# Patient Record
Sex: FEMALE | Race: WHITE | Employment: UNEMPLOYED | URBAN - METROPOLITAN AREA
[De-identification: names, ages, dates, MRNs, and addresses within clinical notes are randomized per-mention and may not be internally consistent; named-entity substitution may affect disease eponyms.]

---

## 2020-08-07 ENCOUNTER — ANESTHESIA EVENT (OUTPATIENT)
Dept: OPERATING ROOM | Age: 40
End: 2020-08-07

## 2020-08-07 ENCOUNTER — APPOINTMENT (OUTPATIENT)
Dept: ULTRASOUND IMAGING | Age: 40
End: 2020-08-07

## 2020-08-07 ENCOUNTER — APPOINTMENT (OUTPATIENT)
Dept: GENERAL RADIOLOGY | Age: 40
End: 2020-08-07

## 2020-08-07 ENCOUNTER — HOSPITAL ENCOUNTER (OUTPATIENT)
Age: 40
Discharge: OTHER INSTITUTION | End: 2020-08-07
Attending: EMERGENCY MEDICINE | Admitting: OBSTETRICS & GYNECOLOGY

## 2020-08-07 ENCOUNTER — ANESTHESIA (OUTPATIENT)
Dept: OPERATING ROOM | Age: 40
End: 2020-08-07

## 2020-08-07 VITALS — OXYGEN SATURATION: 100 % | TEMPERATURE: 98 F | SYSTOLIC BLOOD PRESSURE: 172 MMHG | DIASTOLIC BLOOD PRESSURE: 151 MMHG

## 2020-08-07 VITALS
TEMPERATURE: 97 F | HEIGHT: 67 IN | HEART RATE: 105 BPM | BODY MASS INDEX: 36.1 KG/M2 | RESPIRATION RATE: 32 BRPM | SYSTOLIC BLOOD PRESSURE: 122 MMHG | DIASTOLIC BLOOD PRESSURE: 65 MMHG | WEIGHT: 230 LBS | OXYGEN SATURATION: 94 %

## 2020-08-07 PROBLEM — O00.109 TUBAL PREGNANCY WITHOUT INTRAUTERINE PREGNANCY: Status: ACTIVE | Noted: 2020-08-07

## 2020-08-07 PROBLEM — O00.90 RUPTURED ECTOPIC PREGNANCY: Status: ACTIVE | Noted: 2020-08-07

## 2020-08-07 PROBLEM — K66.1 RUPTURED RIGHT TUBAL ECTOPIC PREGNANCY CAUSING HEMOPERITONEUM: Status: ACTIVE | Noted: 2020-08-07

## 2020-08-07 PROBLEM — O00.101 RUPTURED RIGHT TUBAL ECTOPIC PREGNANCY CAUSING HEMOPERITONEUM: Status: ACTIVE | Noted: 2020-08-07

## 2020-08-07 LAB
A/G RATIO: 1.4 (ref 1.1–2.2)
ABO/RH: NORMAL
ALBUMIN SERPL-MCNC: 4.3 G/DL (ref 3.4–5)
ALP BLD-CCNC: 48 U/L (ref 40–129)
ALT SERPL-CCNC: 10 U/L (ref 10–40)
ANION GAP SERPL CALCULATED.3IONS-SCNC: 13 MMOL/L (ref 3–16)
ANTIBODY SCREEN: NORMAL
AST SERPL-CCNC: 11 U/L (ref 15–37)
BASE EXCESS VENOUS: -6 MMOL/L (ref -3–3)
BASE EXCESS VENOUS: -6.6 MMOL/L (ref -3–3)
BASOPHILS ABSOLUTE: 0.1 K/UL (ref 0–0.2)
BASOPHILS RELATIVE PERCENT: 0.6 %
BILIRUB SERPL-MCNC: 0.6 MG/DL (ref 0–1)
BLOOD BANK DISPENSE STATUS: NORMAL
BLOOD BANK DISPENSE STATUS: NORMAL
BLOOD BANK PRODUCT CODE: NORMAL
BLOOD BANK PRODUCT CODE: NORMAL
BPU ID: NORMAL
BPU ID: NORMAL
BUN BLDV-MCNC: 13 MG/DL (ref 7–20)
CALCIUM IONIZED: 1.01 MMOL/L (ref 1.12–1.32)
CALCIUM SERPL-MCNC: 9.4 MG/DL (ref 8.3–10.6)
CARBOXYHEMOGLOBIN: 3.3 % (ref 0–1.5)
CARBOXYHEMOGLOBIN: 3.8 % (ref 0–1.5)
CHLORIDE BLD-SCNC: 102 MMOL/L (ref 99–110)
CO2: 22 MMOL/L (ref 21–32)
CREAT SERPL-MCNC: 1.1 MG/DL (ref 0.6–1.1)
D DIMER: <200 NG/ML DDU (ref 0–229)
DESCRIPTION BLOOD BANK: NORMAL
DESCRIPTION BLOOD BANK: NORMAL
EKG ATRIAL RATE: 95 BPM
EKG DIAGNOSIS: NORMAL
EKG P AXIS: 6 DEGREES
EKG P-R INTERVAL: 134 MS
EKG Q-T INTERVAL: 382 MS
EKG QRS DURATION: 78 MS
EKG QTC CALCULATION (BAZETT): 480 MS
EKG R AXIS: -14 DEGREES
EKG T AXIS: 1 DEGREES
EKG VENTRICULAR RATE: 95 BPM
EOSINOPHILS ABSOLUTE: 0.2 K/UL (ref 0–0.6)
EOSINOPHILS RELATIVE PERCENT: 1.4 %
ETHANOL: NORMAL MG/DL (ref 0–0.08)
GFR AFRICAN AMERICAN: >60
GFR NON-AFRICAN AMERICAN: 56
GLOBULIN: 3 G/DL
GLUCOSE BLD-MCNC: 226 MG/DL (ref 70–99)
GONADOTROPIN, CHORIONIC (HCG) QUANT: 7821 MIU/ML
HCG QUALITATIVE: POSITIVE
HCO3 VENOUS: 20.4 MMOL/L (ref 23–29)
HCO3 VENOUS: 21.4 MMOL/L (ref 23–29)
HCT VFR BLD CALC: 33.8 % (ref 36–48)
HCT VFR BLD CALC: 39 % (ref 36–48)
HEMOGLOBIN: 11.3 G/DL (ref 12–16)
HEMOGLOBIN: 12.9 G/DL (ref 12–16)
INR BLD: 1.3 (ref 0.86–1.14)
LACTIC ACID: 2.4 MMOL/L (ref 0.4–2)
LACTIC ACID: 3 MMOL/L (ref 0.4–2)
LIPASE: 78 U/L (ref 13–60)
LYMPHOCYTES ABSOLUTE: 3.5 K/UL (ref 1–5.1)
LYMPHOCYTES RELATIVE PERCENT: 22.4 %
MAGNESIUM: 2 MG/DL (ref 1.8–2.4)
MCH RBC QN AUTO: 30.5 PG (ref 26–34)
MCHC RBC AUTO-ENTMCNC: 33.2 G/DL (ref 31–36)
MCV RBC AUTO: 91.8 FL (ref 80–100)
METHEMOGLOBIN VENOUS: 0.3 %
METHEMOGLOBIN VENOUS: 0.5 %
MONOCYTES ABSOLUTE: 0.7 K/UL (ref 0–1.3)
MONOCYTES RELATIVE PERCENT: 4.3 %
NEUTROPHILS ABSOLUTE: 11 K/UL (ref 1.7–7.7)
NEUTROPHILS RELATIVE PERCENT: 71.3 %
O2 CONTENT, VEN: 12 VOL %
O2 CONTENT, VEN: 12 VOL %
O2 SAT, VEN: 67 %
O2 SAT, VEN: 80 %
O2 THERAPY: ABNORMAL
O2 THERAPY: ABNORMAL
PCO2, VEN: 46.5 MMHG (ref 40–50)
PCO2, VEN: 49.7 MMHG (ref 40–50)
PDW BLD-RTO: 12.6 % (ref 12.4–15.4)
PH VENOUS: 7.25 (ref 7.35–7.45)
PH VENOUS: 7.26 (ref 7.35–7.45)
PH VENOUS: 7.28 (ref 7.35–7.45)
PLATELET # BLD: 345 K/UL (ref 135–450)
PMV BLD AUTO: 9.5 FL (ref 5–10.5)
PO2, VEN: 38.2 MMHG (ref 25–40)
PO2, VEN: 47.3 MMHG (ref 25–40)
POTASSIUM SERPL-SCNC: 4 MMOL/L (ref 3.5–5.1)
PROTHROMBIN TIME: 15.1 SEC (ref 10–13.2)
RBC # BLD: 4.25 M/UL (ref 4–5.2)
SARS-COV-2, NAAT: NOT DETECTED
SODIUM BLD-SCNC: 137 MMOL/L (ref 136–145)
TCO2 CALC VENOUS: 22 MMOL/L
TCO2 CALC VENOUS: 23 MMOL/L
TOTAL PROTEIN: 7.3 G/DL (ref 6.4–8.2)
TROPONIN: <0.01 NG/ML
WBC # BLD: 15.4 K/UL (ref 4–11)

## 2020-08-07 PROCEDURE — 82803 BLOOD GASES ANY COMBINATION: CPT

## 2020-08-07 PROCEDURE — 36430 TRANSFUSION BLD/BLD COMPNT: CPT

## 2020-08-07 PROCEDURE — 94761 N-INVAS EAR/PLS OXIMETRY MLT: CPT

## 2020-08-07 PROCEDURE — 82947 ASSAY GLUCOSE BLOOD QUANT: CPT

## 2020-08-07 PROCEDURE — 3600000014 HC SURGERY LEVEL 4 ADDTL 15MIN: Performed by: OBSTETRICS & GYNECOLOGY

## 2020-08-07 PROCEDURE — 2500000003 HC RX 250 WO HCPCS: Performed by: NURSE ANESTHETIST, CERTIFIED REGISTERED

## 2020-08-07 PROCEDURE — 80053 COMPREHEN METABOLIC PANEL: CPT

## 2020-08-07 PROCEDURE — 2720000010 HC SURG SUPPLY STERILE: Performed by: OBSTETRICS & GYNECOLOGY

## 2020-08-07 PROCEDURE — 2580000003 HC RX 258: Performed by: OBSTETRICS & GYNECOLOGY

## 2020-08-07 PROCEDURE — 84702 CHORIONIC GONADOTROPIN TEST: CPT

## 2020-08-07 PROCEDURE — 86923 COMPATIBILITY TEST ELECTRIC: CPT

## 2020-08-07 PROCEDURE — 3600000004 HC SURGERY LEVEL 4 BASE: Performed by: OBSTETRICS & GYNECOLOGY

## 2020-08-07 PROCEDURE — 3700000001 HC ADD 15 MINUTES (ANESTHESIA): Performed by: OBSTETRICS & GYNECOLOGY

## 2020-08-07 PROCEDURE — 85025 COMPLETE CBC W/AUTO DIFF WBC: CPT

## 2020-08-07 PROCEDURE — 86850 RBC ANTIBODY SCREEN: CPT

## 2020-08-07 PROCEDURE — 83690 ASSAY OF LIPASE: CPT

## 2020-08-07 PROCEDURE — U0002 COVID-19 LAB TEST NON-CDC: HCPCS

## 2020-08-07 PROCEDURE — 85379 FIBRIN DEGRADATION QUANT: CPT

## 2020-08-07 PROCEDURE — 6360000002 HC RX W HCPCS: Performed by: EMERGENCY MEDICINE

## 2020-08-07 PROCEDURE — 88305 TISSUE EXAM BY PATHOLOGIST: CPT

## 2020-08-07 PROCEDURE — 82330 ASSAY OF CALCIUM: CPT

## 2020-08-07 PROCEDURE — 3700000000 HC ANESTHESIA ATTENDED CARE: Performed by: OBSTETRICS & GYNECOLOGY

## 2020-08-07 PROCEDURE — 7100000001 HC PACU RECOVERY - ADDTL 15 MIN: Performed by: OBSTETRICS & GYNECOLOGY

## 2020-08-07 PROCEDURE — 84132 ASSAY OF SERUM POTASSIUM: CPT

## 2020-08-07 PROCEDURE — 85610 PROTHROMBIN TIME: CPT

## 2020-08-07 PROCEDURE — 85018 HEMOGLOBIN: CPT

## 2020-08-07 PROCEDURE — 6360000002 HC RX W HCPCS: Performed by: NURSE ANESTHETIST, CERTIFIED REGISTERED

## 2020-08-07 PROCEDURE — 99291 CRITICAL CARE FIRST HOUR: CPT

## 2020-08-07 PROCEDURE — P9016 RBC LEUKOCYTES REDUCED: HCPCS

## 2020-08-07 PROCEDURE — 84295 ASSAY OF SERUM SODIUM: CPT

## 2020-08-07 PROCEDURE — 2580000003 HC RX 258

## 2020-08-07 PROCEDURE — 88331 PATH CONSLTJ SURG 1 BLK 1SPC: CPT

## 2020-08-07 PROCEDURE — 71045 X-RAY EXAM CHEST 1 VIEW: CPT

## 2020-08-07 PROCEDURE — 6370000000 HC RX 637 (ALT 250 FOR IP): Performed by: ANESTHESIOLOGY

## 2020-08-07 PROCEDURE — 59151 TREAT ECTOPIC PREGNANCY: CPT | Performed by: OBSTETRICS & GYNECOLOGY

## 2020-08-07 PROCEDURE — G0480 DRUG TEST DEF 1-7 CLASSES: HCPCS

## 2020-08-07 PROCEDURE — 96374 THER/PROPH/DIAG INJ IV PUSH: CPT

## 2020-08-07 PROCEDURE — 2709999900 HC NON-CHARGEABLE SUPPLY: Performed by: OBSTETRICS & GYNECOLOGY

## 2020-08-07 PROCEDURE — 7100000010 HC PHASE II RECOVERY - FIRST 15 MIN: Performed by: OBSTETRICS & GYNECOLOGY

## 2020-08-07 PROCEDURE — 93005 ELECTROCARDIOGRAM TRACING: CPT | Performed by: EMERGENCY MEDICINE

## 2020-08-07 PROCEDURE — P9045 ALBUMIN (HUMAN), 5%, 250 ML: HCPCS | Performed by: NURSE ANESTHETIST, CERTIFIED REGISTERED

## 2020-08-07 PROCEDURE — 96361 HYDRATE IV INFUSION ADD-ON: CPT

## 2020-08-07 PROCEDURE — 6360000002 HC RX W HCPCS: Performed by: ANESTHESIOLOGY

## 2020-08-07 PROCEDURE — 83735 ASSAY OF MAGNESIUM: CPT

## 2020-08-07 PROCEDURE — 6360000002 HC RX W HCPCS: Performed by: OBSTETRICS & GYNECOLOGY

## 2020-08-07 PROCEDURE — 87040 BLOOD CULTURE FOR BACTERIA: CPT

## 2020-08-07 PROCEDURE — 84484 ASSAY OF TROPONIN QUANT: CPT

## 2020-08-07 PROCEDURE — 86900 BLOOD TYPING SEROLOGIC ABO: CPT

## 2020-08-07 PROCEDURE — 86901 BLOOD TYPING SEROLOGIC RH(D): CPT

## 2020-08-07 PROCEDURE — 7100000000 HC PACU RECOVERY - FIRST 15 MIN: Performed by: OBSTETRICS & GYNECOLOGY

## 2020-08-07 PROCEDURE — 83605 ASSAY OF LACTIC ACID: CPT

## 2020-08-07 PROCEDURE — 85014 HEMATOCRIT: CPT

## 2020-08-07 PROCEDURE — 36415 COLL VENOUS BLD VENIPUNCTURE: CPT

## 2020-08-07 PROCEDURE — 7100000011 HC PHASE II RECOVERY - ADDTL 15 MIN: Performed by: OBSTETRICS & GYNECOLOGY

## 2020-08-07 PROCEDURE — 84703 CHORIONIC GONADOTROPIN ASSAY: CPT

## 2020-08-07 PROCEDURE — 76817 TRANSVAGINAL US OBSTETRIC: CPT

## 2020-08-07 PROCEDURE — 93010 ELECTROCARDIOGRAM REPORT: CPT | Performed by: INTERNAL MEDICINE

## 2020-08-07 PROCEDURE — 2580000003 HC RX 258: Performed by: NURSE ANESTHETIST, CERTIFIED REGISTERED

## 2020-08-07 RX ORDER — ALBUMIN, HUMAN INJ 5% 5 %
SOLUTION INTRAVENOUS PRN
Status: DISCONTINUED | OUTPATIENT
Start: 2020-08-07 | End: 2020-08-07 | Stop reason: SDUPTHER

## 2020-08-07 RX ORDER — FENTANYL CITRATE 50 UG/ML
25 INJECTION, SOLUTION INTRAMUSCULAR; INTRAVENOUS EVERY 5 MIN PRN
Status: DISCONTINUED | OUTPATIENT
Start: 2020-08-07 | End: 2020-08-12 | Stop reason: HOSPADM

## 2020-08-07 RX ORDER — DEXAMETHASONE SODIUM PHOSPHATE 4 MG/ML
INJECTION, SOLUTION INTRA-ARTICULAR; INTRALESIONAL; INTRAMUSCULAR; INTRAVENOUS; SOFT TISSUE PRN
Status: DISCONTINUED | OUTPATIENT
Start: 2020-08-07 | End: 2020-08-07 | Stop reason: SDUPTHER

## 2020-08-07 RX ORDER — MIDAZOLAM HYDROCHLORIDE 1 MG/ML
INJECTION INTRAMUSCULAR; INTRAVENOUS PRN
Status: DISCONTINUED | OUTPATIENT
Start: 2020-08-07 | End: 2020-08-07 | Stop reason: SDUPTHER

## 2020-08-07 RX ORDER — SUCCINYLCHOLINE CHLORIDE 20 MG/ML
INJECTION INTRAMUSCULAR; INTRAVENOUS PRN
Status: DISCONTINUED | OUTPATIENT
Start: 2020-08-07 | End: 2020-08-07 | Stop reason: SDUPTHER

## 2020-08-07 RX ORDER — PHENYLEPHRINE HCL IN 0.9% NACL 1 MG/10 ML
SYRINGE (ML) INTRAVENOUS PRN
Status: DISCONTINUED | OUTPATIENT
Start: 2020-08-07 | End: 2020-08-07 | Stop reason: SDUPTHER

## 2020-08-07 RX ORDER — ONDANSETRON 2 MG/ML
4 INJECTION INTRAMUSCULAR; INTRAVENOUS ONCE
Status: COMPLETED | OUTPATIENT
Start: 2020-08-07 | End: 2020-08-07

## 2020-08-07 RX ORDER — CALCIUM GLUCONATE 20 MG/ML
1 INJECTION, SOLUTION INTRAVENOUS ONCE
Status: DISCONTINUED | OUTPATIENT
Start: 2020-08-07 | End: 2020-08-12 | Stop reason: HOSPADM

## 2020-08-07 RX ORDER — SODIUM CHLORIDE 9 MG/ML
INJECTION, SOLUTION INTRAVENOUS
Status: COMPLETED
Start: 2020-08-07 | End: 2020-08-07

## 2020-08-07 RX ORDER — SODIUM CHLORIDE, SODIUM LACTATE, POTASSIUM CHLORIDE, CALCIUM CHLORIDE 600; 310; 30; 20 MG/100ML; MG/100ML; MG/100ML; MG/100ML
INJECTION, SOLUTION INTRAVENOUS CONTINUOUS PRN
Status: DISCONTINUED | OUTPATIENT
Start: 2020-08-07 | End: 2020-08-07 | Stop reason: SDUPTHER

## 2020-08-07 RX ORDER — HYDRALAZINE HYDROCHLORIDE 20 MG/ML
5 INJECTION INTRAMUSCULAR; INTRAVENOUS EVERY 10 MIN PRN
Status: DISCONTINUED | OUTPATIENT
Start: 2020-08-07 | End: 2020-08-12 | Stop reason: HOSPADM

## 2020-08-07 RX ORDER — SODIUM CHLORIDE, SODIUM LACTATE, POTASSIUM CHLORIDE, CALCIUM CHLORIDE 600; 310; 30; 20 MG/100ML; MG/100ML; MG/100ML; MG/100ML
INJECTION, SOLUTION INTRAVENOUS CONTINUOUS
Status: CANCELLED | OUTPATIENT
Start: 2020-08-07

## 2020-08-07 RX ORDER — ONDANSETRON 2 MG/ML
INJECTION INTRAMUSCULAR; INTRAVENOUS PRN
Status: DISCONTINUED | OUTPATIENT
Start: 2020-08-07 | End: 2020-08-07 | Stop reason: SDUPTHER

## 2020-08-07 RX ORDER — SODIUM CHLORIDE, SODIUM LACTATE, POTASSIUM CHLORIDE, CALCIUM CHLORIDE 600; 310; 30; 20 MG/100ML; MG/100ML; MG/100ML; MG/100ML
INJECTION, SOLUTION INTRAVENOUS
Status: DISCONTINUED
Start: 2020-08-07 | End: 2020-08-08 | Stop reason: HOSPADM

## 2020-08-07 RX ORDER — 0.9 % SODIUM CHLORIDE 0.9 %
1000 INTRAVENOUS SOLUTION INTRAVENOUS ONCE
Status: DISCONTINUED | OUTPATIENT
Start: 2020-08-07 | End: 2020-08-12 | Stop reason: HOSPADM

## 2020-08-07 RX ORDER — SODIUM CHLORIDE, SODIUM LACTATE, POTASSIUM CHLORIDE, AND CALCIUM CHLORIDE .6; .31; .03; .02 G/100ML; G/100ML; G/100ML; G/100ML
1000 INJECTION, SOLUTION INTRAVENOUS ONCE
Status: COMPLETED | OUTPATIENT
Start: 2020-08-07 | End: 2020-08-07

## 2020-08-07 RX ORDER — PROMETHAZINE HYDROCHLORIDE 25 MG/ML
6.25 INJECTION, SOLUTION INTRAMUSCULAR; INTRAVENOUS
Status: DISCONTINUED | OUTPATIENT
Start: 2020-08-07 | End: 2020-08-07 | Stop reason: HOSPADM

## 2020-08-07 RX ORDER — OXYCODONE HYDROCHLORIDE AND ACETAMINOPHEN 5; 325 MG/1; MG/1
1 TABLET ORAL PRN
Status: COMPLETED | OUTPATIENT
Start: 2020-08-07 | End: 2020-08-07

## 2020-08-07 RX ORDER — ONDANSETRON 2 MG/ML
4 INJECTION INTRAMUSCULAR; INTRAVENOUS
Status: DISCONTINUED | OUTPATIENT
Start: 2020-08-07 | End: 2020-08-07 | Stop reason: HOSPADM

## 2020-08-07 RX ORDER — FENTANYL CITRATE 50 UG/ML
INJECTION, SOLUTION INTRAMUSCULAR; INTRAVENOUS PRN
Status: DISCONTINUED | OUTPATIENT
Start: 2020-08-07 | End: 2020-08-07 | Stop reason: SDUPTHER

## 2020-08-07 RX ORDER — SODIUM CHLORIDE, SODIUM LACTATE, POTASSIUM CHLORIDE, CALCIUM CHLORIDE 600; 310; 30; 20 MG/100ML; MG/100ML; MG/100ML; MG/100ML
INJECTION, SOLUTION INTRAVENOUS
Status: COMPLETED
Start: 2020-08-07 | End: 2020-08-07

## 2020-08-07 RX ORDER — SODIUM CHLORIDE 0.9 % (FLUSH) 0.9 %
10 SYRINGE (ML) INJECTION PRN
Status: CANCELLED | OUTPATIENT
Start: 2020-08-07

## 2020-08-07 RX ORDER — OXYCODONE HYDROCHLORIDE AND ACETAMINOPHEN 5; 325 MG/1; MG/1
2 TABLET ORAL EVERY 6 HOURS PRN
Qty: 28 TABLET | Refills: 0 | Status: SHIPPED | OUTPATIENT
Start: 2020-08-07 | End: 2020-08-14 | Stop reason: SDUPTHER

## 2020-08-07 RX ORDER — PROPOFOL 10 MG/ML
INJECTION, EMULSION INTRAVENOUS PRN
Status: DISCONTINUED | OUTPATIENT
Start: 2020-08-07 | End: 2020-08-07 | Stop reason: SDUPTHER

## 2020-08-07 RX ORDER — MEPERIDINE HYDROCHLORIDE 50 MG/ML
12.5 INJECTION INTRAMUSCULAR; INTRAVENOUS; SUBCUTANEOUS EVERY 5 MIN PRN
Status: DISCONTINUED | OUTPATIENT
Start: 2020-08-07 | End: 2020-08-12 | Stop reason: HOSPADM

## 2020-08-07 RX ORDER — SODIUM CHLORIDE, SODIUM LACTATE, POTASSIUM CHLORIDE, AND CALCIUM CHLORIDE .6; .31; .03; .02 G/100ML; G/100ML; G/100ML; G/100ML
IRRIGANT IRRIGATION PRN
Status: DISCONTINUED | OUTPATIENT
Start: 2020-08-07 | End: 2020-08-07 | Stop reason: ALTCHOICE

## 2020-08-07 RX ORDER — SODIUM CHLORIDE 0.9 % (FLUSH) 0.9 %
10 SYRINGE (ML) INJECTION EVERY 12 HOURS SCHEDULED
Status: CANCELLED | OUTPATIENT
Start: 2020-08-07

## 2020-08-07 RX ORDER — OXYCODONE HYDROCHLORIDE AND ACETAMINOPHEN 5; 325 MG/1; MG/1
2 TABLET ORAL PRN
Status: COMPLETED | OUTPATIENT
Start: 2020-08-07 | End: 2020-08-07

## 2020-08-07 RX ORDER — LIDOCAINE HYDROCHLORIDE 20 MG/ML
INJECTION, SOLUTION INFILTRATION; PERINEURAL PRN
Status: DISCONTINUED | OUTPATIENT
Start: 2020-08-07 | End: 2020-08-07 | Stop reason: SDUPTHER

## 2020-08-07 RX ORDER — 0.9 % SODIUM CHLORIDE 0.9 %
250 INTRAVENOUS SOLUTION INTRAVENOUS ONCE
Status: DISCONTINUED | OUTPATIENT
Start: 2020-08-07 | End: 2020-08-12 | Stop reason: HOSPADM

## 2020-08-07 RX ORDER — SODIUM CHLORIDE 9 MG/ML
INJECTION, SOLUTION INTRAVENOUS CONTINUOUS PRN
Status: DISCONTINUED | OUTPATIENT
Start: 2020-08-07 | End: 2020-08-07 | Stop reason: SDUPTHER

## 2020-08-07 RX ORDER — PROPOFOL 10 MG/ML
INJECTION, EMULSION INTRAVENOUS CONTINUOUS PRN
Status: DISCONTINUED | OUTPATIENT
Start: 2020-08-07 | End: 2020-08-07

## 2020-08-07 RX ORDER — ROCURONIUM BROMIDE 10 MG/ML
INJECTION, SOLUTION INTRAVENOUS PRN
Status: DISCONTINUED | OUTPATIENT
Start: 2020-08-07 | End: 2020-08-07 | Stop reason: SDUPTHER

## 2020-08-07 RX ORDER — 0.9 % SODIUM CHLORIDE 0.9 %
1000 INTRAVENOUS SOLUTION INTRAVENOUS ONCE
Status: COMPLETED | OUTPATIENT
Start: 2020-08-07 | End: 2020-08-07

## 2020-08-07 RX ADMIN — SUCCINYLCHOLINE CHLORIDE 140 MG: 20 INJECTION, SOLUTION INTRAMUSCULAR; INTRAVENOUS at 09:25

## 2020-08-07 RX ADMIN — FENTANYL CITRATE 50 MCG: 50 INJECTION INTRAMUSCULAR; INTRAVENOUS at 10:26

## 2020-08-07 RX ADMIN — MIDAZOLAM HYDROCHLORIDE 2 MG: 2 INJECTION, SOLUTION INTRAMUSCULAR; INTRAVENOUS at 09:16

## 2020-08-07 RX ADMIN — Medication 80 MCG: at 09:41

## 2020-08-07 RX ADMIN — Medication 80 MCG: at 10:06

## 2020-08-07 RX ADMIN — Medication 80 MCG: at 09:33

## 2020-08-07 RX ADMIN — Medication 1000 ML: at 06:25

## 2020-08-07 RX ADMIN — OXYCODONE HYDROCHLORIDE AND ACETAMINOPHEN 2 TABLET: 5; 325 TABLET ORAL at 16:43

## 2020-08-07 RX ADMIN — FENTANYL CITRATE 50 MCG: 50 INJECTION INTRAMUSCULAR; INTRAVENOUS at 10:23

## 2020-08-07 RX ADMIN — ONDANSETRON 4 MG: 2 INJECTION INTRAMUSCULAR; INTRAVENOUS at 10:55

## 2020-08-07 RX ADMIN — SODIUM CHLORIDE, POTASSIUM CHLORIDE, SODIUM LACTATE AND CALCIUM CHLORIDE: 600; 310; 30; 20 INJECTION, SOLUTION INTRAVENOUS at 09:16

## 2020-08-07 RX ADMIN — SODIUM CHLORIDE: 9 INJECTION, SOLUTION INTRAVENOUS at 10:23

## 2020-08-07 RX ADMIN — ROCURONIUM BROMIDE 20 MG: 10 SOLUTION INTRAVENOUS at 10:26

## 2020-08-07 RX ADMIN — SODIUM CHLORIDE, SODIUM LACTATE, POTASSIUM CHLORIDE, AND CALCIUM CHLORIDE 1000 ML: .6; .31; .03; .02 INJECTION, SOLUTION INTRAVENOUS at 08:54

## 2020-08-07 RX ADMIN — LIDOCAINE HYDROCHLORIDE 60 MG: 20 INJECTION, SOLUTION INFILTRATION; PERINEURAL at 09:25

## 2020-08-07 RX ADMIN — Medication 80 MCG: at 10:37

## 2020-08-07 RX ADMIN — Medication 80 MCG: at 10:23

## 2020-08-07 RX ADMIN — FENTANYL CITRATE: 50 INJECTION, SOLUTION INTRAMUSCULAR; INTRAVENOUS at 12:16

## 2020-08-07 RX ADMIN — SODIUM CHLORIDE, POTASSIUM CHLORIDE, SODIUM LACTATE AND CALCIUM CHLORIDE: 600; 310; 30; 20 INJECTION, SOLUTION INTRAVENOUS at 10:26

## 2020-08-07 RX ADMIN — Medication 80 MCG: at 10:13

## 2020-08-07 RX ADMIN — FENTANYL CITRATE 50 MCG: 50 INJECTION INTRAMUSCULAR; INTRAVENOUS at 09:25

## 2020-08-07 RX ADMIN — Medication 80 MCG: at 09:23

## 2020-08-07 RX ADMIN — SODIUM CHLORIDE, SODIUM LACTATE, POTASSIUM CHLORIDE, AND CALCIUM CHLORIDE 1000 ML: .6; .31; .03; .02 INJECTION, SOLUTION INTRAVENOUS at 07:09

## 2020-08-07 RX ADMIN — SODIUM CHLORIDE, POTASSIUM CHLORIDE, SODIUM LACTATE AND CALCIUM CHLORIDE 1000 ML: 600; 310; 30; 20 INJECTION, SOLUTION INTRAVENOUS at 08:54

## 2020-08-07 RX ADMIN — FENTANYL CITRATE 25 MCG: 50 INJECTION, SOLUTION INTRAMUSCULAR; INTRAVENOUS at 11:47

## 2020-08-07 RX ADMIN — SODIUM CHLORIDE 1000 ML: 9 INJECTION, SOLUTION INTRAVENOUS at 06:25

## 2020-08-07 RX ADMIN — FENTANYL CITRATE 50 MCG: 50 INJECTION INTRAMUSCULAR; INTRAVENOUS at 09:16

## 2020-08-07 RX ADMIN — Medication 80 MCG: at 10:41

## 2020-08-07 RX ADMIN — SODIUM CHLORIDE: 9 INJECTION, SOLUTION INTRAVENOUS at 09:30

## 2020-08-07 RX ADMIN — SODIUM CHLORIDE, POTASSIUM CHLORIDE, SODIUM LACTATE AND CALCIUM CHLORIDE 1000 ML: 600; 310; 30; 20 INJECTION, SOLUTION INTRAVENOUS at 07:09

## 2020-08-07 RX ADMIN — FENTANYL CITRATE 50 MCG: 50 INJECTION INTRAMUSCULAR; INTRAVENOUS at 11:04

## 2020-08-07 RX ADMIN — Medication 80 MCG: at 09:56

## 2020-08-07 RX ADMIN — FENTANYL CITRATE 25 MCG: 50 INJECTION, SOLUTION INTRAMUSCULAR; INTRAVENOUS at 15:55

## 2020-08-07 RX ADMIN — ONDANSETRON 4 MG: 2 INJECTION INTRAMUSCULAR; INTRAVENOUS at 06:54

## 2020-08-07 RX ADMIN — ALBUMIN (HUMAN) 250 ML: 12.5 INJECTION, SOLUTION INTRAVENOUS at 10:49

## 2020-08-07 RX ADMIN — Medication 3 G: at 08:54

## 2020-08-07 RX ADMIN — PROPOFOL 200 MG: 10 INJECTION, EMULSION INTRAVENOUS at 09:25

## 2020-08-07 RX ADMIN — DEXAMETHASONE SODIUM PHOSPHATE 8 MG: 4 INJECTION, SOLUTION INTRAMUSCULAR; INTRAVENOUS at 09:40

## 2020-08-07 RX ADMIN — SUGAMMADEX 209 MG: 100 INJECTION, SOLUTION INTRAVENOUS at 10:55

## 2020-08-07 RX ADMIN — Medication 80 MCG: at 11:02

## 2020-08-07 RX ADMIN — FENTANYL CITRATE 50 MCG: 50 INJECTION INTRAMUSCULAR; INTRAVENOUS at 11:02

## 2020-08-07 RX ADMIN — ROCURONIUM BROMIDE 50 MG: 10 SOLUTION INTRAVENOUS at 09:31

## 2020-08-07 ASSESSMENT — PULMONARY FUNCTION TESTS
PIF_VALUE: 36
PIF_VALUE: 28
PIF_VALUE: 5
PIF_VALUE: 29
PIF_VALUE: 37
PIF_VALUE: 31
PIF_VALUE: 2
PIF_VALUE: 22
PIF_VALUE: 34
PIF_VALUE: 31
PIF_VALUE: 5
PIF_VALUE: 32
PIF_VALUE: 3
PIF_VALUE: 3
PIF_VALUE: 4
PIF_VALUE: 37
PIF_VALUE: 24
PIF_VALUE: 7
PIF_VALUE: 6
PIF_VALUE: 22
PIF_VALUE: 32
PIF_VALUE: 29
PIF_VALUE: 28
PIF_VALUE: 32
PIF_VALUE: 30
PIF_VALUE: 21
PIF_VALUE: 22
PIF_VALUE: 31
PIF_VALUE: 3
PIF_VALUE: 38
PIF_VALUE: 29
PIF_VALUE: 37
PIF_VALUE: 34
PIF_VALUE: 31
PIF_VALUE: 1
PIF_VALUE: 23
PIF_VALUE: 37
PIF_VALUE: 7
PIF_VALUE: 38
PIF_VALUE: 37
PIF_VALUE: 32
PIF_VALUE: 3
PIF_VALUE: 37
PIF_VALUE: 36
PIF_VALUE: 31
PIF_VALUE: 3
PIF_VALUE: 33
PIF_VALUE: 27
PIF_VALUE: 3
PIF_VALUE: 1
PIF_VALUE: 32
PIF_VALUE: 21
PIF_VALUE: 1
PIF_VALUE: 1
PIF_VALUE: 31
PIF_VALUE: 37
PIF_VALUE: 3
PIF_VALUE: 34
PIF_VALUE: 33
PIF_VALUE: 37
PIF_VALUE: 0
PIF_VALUE: 36
PIF_VALUE: 30
PIF_VALUE: 35
PIF_VALUE: 7
PIF_VALUE: 31
PIF_VALUE: 32
PIF_VALUE: 34
PIF_VALUE: 29
PIF_VALUE: 25
PIF_VALUE: 37
PIF_VALUE: 5
PIF_VALUE: 35
PIF_VALUE: 34
PIF_VALUE: 35
PIF_VALUE: 6
PIF_VALUE: 36
PIF_VALUE: 3
PIF_VALUE: 29
PIF_VALUE: 3
PIF_VALUE: 20
PIF_VALUE: 5
PIF_VALUE: 21
PIF_VALUE: 4
PIF_VALUE: 23
PIF_VALUE: 0
PIF_VALUE: 25
PIF_VALUE: 26
PIF_VALUE: 31
PIF_VALUE: 34
PIF_VALUE: 23
PIF_VALUE: 1
PIF_VALUE: 2
PIF_VALUE: 36
PIF_VALUE: 24
PIF_VALUE: 26
PIF_VALUE: 31
PIF_VALUE: 6
PIF_VALUE: 37
PIF_VALUE: 37
PIF_VALUE: 35
PIF_VALUE: 37
PIF_VALUE: 31
PIF_VALUE: 7
PIF_VALUE: 31
PIF_VALUE: 33
PIF_VALUE: 1
PIF_VALUE: 35
PIF_VALUE: 31
PIF_VALUE: 23
PIF_VALUE: 26
PIF_VALUE: 37
PIF_VALUE: 30
PIF_VALUE: 1
PIF_VALUE: 36
PIF_VALUE: 1
PIF_VALUE: 36
PIF_VALUE: 21
PIF_VALUE: 23
PIF_VALUE: 26
PIF_VALUE: 2
PIF_VALUE: 30
PIF_VALUE: 26
PIF_VALUE: 37
PIF_VALUE: 5
PIF_VALUE: 31
PIF_VALUE: 34
PIF_VALUE: 29
PIF_VALUE: 35
PIF_VALUE: 33
PIF_VALUE: 21

## 2020-08-07 ASSESSMENT — ENCOUNTER SYMPTOMS
EYE PAIN: 0
BACK PAIN: 0
DIARRHEA: 0
PHOTOPHOBIA: 0
ABDOMINAL PAIN: 1
SHORTNESS OF BREATH: 0
NAUSEA: 0
VOMITING: 0
EYE REDNESS: 0

## 2020-08-07 ASSESSMENT — PAIN SCALES - GENERAL
PAINLEVEL_OUTOF10: 6
PAINLEVEL_OUTOF10: 9
PAINLEVEL_OUTOF10: 6
PAINLEVEL_OUTOF10: 8
PAINLEVEL_OUTOF10: 8
PAINLEVEL_OUTOF10: 7

## 2020-08-07 ASSESSMENT — PAIN DESCRIPTION - DESCRIPTORS
DESCRIPTORS: ACHING
DESCRIPTORS: ACHING

## 2020-08-07 ASSESSMENT — PAIN DESCRIPTION - ORIENTATION
ORIENTATION: RIGHT;LEFT
ORIENTATION: RIGHT;LEFT

## 2020-08-07 ASSESSMENT — PAIN DESCRIPTION - LOCATION
LOCATION: SHOULDER
LOCATION: SHOULDER

## 2020-08-07 ASSESSMENT — PAIN DESCRIPTION - FREQUENCY
FREQUENCY: INTERMITTENT
FREQUENCY: INTERMITTENT

## 2020-08-07 ASSESSMENT — PAIN DESCRIPTION - PAIN TYPE
TYPE: SURGICAL PAIN
TYPE: ACUTE PAIN

## 2020-08-07 NOTE — ED NOTES
Pt moved back up in bed after transvaginal ultrasound and is resting on her left side with vital cycling and fluids running.       Lowell Singh RN  08/07/20 6488

## 2020-08-07 NOTE — ED NOTES
Pt postioned on to her back with fluid switch to the 18G in her right AC. Fluids running freely, pt still complaining of abd pain and SOB. Pt sts, \"I feel like a cant get a deep breath. \"     Ramon Jain RN  08/07/20 5153

## 2020-08-07 NOTE — ANESTHESIA PRE PROCEDURE
Department of Anesthesiology  Preprocedure Note       Name:  Bettina Sibley   Age:  42 y.o.  :  1980                                          MRN:  [id number]         Date:  2020      Surgeon:  Kavin Fajardo DO    Procedure:  OPERATIVE LAPAROSCOPY FOR RUPTURED ECTOPIC PREGNANCY    HPI:  The patient is a 42 y.o.  female who presented to ER for evaluation secondary to pelvic pain. Pain started at 4 am today. Denies similar pain in the past.  Patient unaware of pregnancy. FDLMP unknown. History positive for endometriosis, history of postpartum hemorrhage, history of , and morbid obesity. Denies significant vaginal bleeding and discharge.      Medications prior to admission:    DICLOFENAC PO Take 75 mg by mouth 2 times daily   acetaminophen (TYLENOL) 500 MG tablet Take 500 mg by mouth every 6 hours as needed for Pain     Allergies:     Codeine     Dilaudid [Hydromorphone]      Problem List:     PIH (pregnancy induced hypertension)    Extreme obesity    H/O maternal blood transfusion, currently pregnant    Elevated blood sugar level    Macrosomic baby    Adipex exposure    Maternal exposure to alcohol    Other specified counseling     Past Medical History:     Arthritis     BMI 45.0-49.9, adult ([facility]     Endometriosis     Maternal blood transfusion     PP hemmorhage     Morbidly obese ([facility]     Pregnancy related carpal tunnel syndrome, antepartum      Past Surgical History:      SECTION      CHOLECYSTECTOMY      PELVIC LAPAROSCOPY  age 17    Endometriosis     Social History:     Smoking status: Former Smoker     Types: Cigarettes     Last attempt to quit: 2013     Years since quittin.1    Smokeless tobacco: Never Used   Substance Use Topics    Alcohol use: No     Vital Signs (Current):   BP: 104/61  Pulse: 92    Resp: 32  SpO2: 100    Temp:     Height: 5' 7\" (1.702 m)  (20)  Weight: 230 lb (104.3 kg)  (20)    BMI: 36.1 08/07/20 0800 08/07/20 0803 08/07/20 0823   BP: (!) 66/33 (!) 80/42 108/73   Pulse: 89 84 88   Resp: (!) 33 (!) 37 (!) 33   Temp:      TempSrc:      SpO2: 99% 100% 100%     BP Readings from Last 3 Encounters:   08/07/20 104/61   11/09/16 (!) 134/91   10/27/15 138/89     NPO Status:  >8 hrs                         BMI:   Wt Readings from Last 3 Encounters:   08/07/20 230 lb (104.3 kg)   11/09/16 (!) 308 lb (139.7 kg)   10/27/15 (!) 321 lb 8 oz (145.8 kg)     Body mass index is 36.02 kg/m². CBC:     WBC 15.4 08/07/2020    HGB 12.9 08/07/2020    HCT 39.0 08/07/2020     08/07/2020     CMP:     08/07/2020    K 4.0 08/07/2020     08/07/2020    CO2 22 08/07/2020    BUN 13 08/07/2020    CREATININE 1.1 08/07/2020    GFRAA >60 08/07/2020    GLUCOSE 226 08/07/2020    PROT 7.3 08/07/2020    CALCIUM 9.4 08/07/2020    BILITOT 0.6 08/07/2020    ALKPHOS 48 08/07/2020    AST 11 08/07/2020    ALT 10 08/07/2020     Coags:    PROTIME 15.1 08/07/2020    INR 1.30 08/07/2020     COVID-19 Screening (If Applicable): No results found for: COVID19    Anesthesia Evaluation  Patient summary reviewed and Nursing notes reviewed  Airway: Mallampati: II  TM distance: >3 FB   Neck ROM: full  Mouth opening: > = 3 FB Dental:          Pulmonary: breath sounds clear to auscultation      (-) COPD, asthma and wheezes                           Cardiovascular:  Exercise tolerance: good (>4 METS),   (+) hypertension:,     (-)  angina,  JOSE and murmur      Rhythm: regular                      Neuro/Psych:      (-) seizures, TIA and CVA           GI/Hepatic/Renal:   (+) morbid obesity     (-) hepatitis, liver disease and no renal disease       Endo/Other:    (+) blood dyscrasia: anemia:., .    (-) diabetes mellitus, hypothyroidism, arthritis               Abdominal:   (+) obese,         Vascular:     - DVT and PE.                                   Anesthesia Plan      general     ASA 3 - emergent       Induction: intravenous and rapid sequence. MIPS: Prophylactic antiemetics administered. Anesthetic plan and risks discussed with patient. Plan discussed with CRNA.             Aurelia Reese MD

## 2020-08-07 NOTE — ED NOTES
Dr. Albert Duran at bedside to discuss finding with patient and SO.       Caity Jeter RN  08/07/20 0705

## 2020-08-07 NOTE — ED NOTES
Patient identified as a positive fall risk on the ED triage fall screening. Patient placed in fall precautions which includes:  yellow fall risk bracelet on wrist, yellow socks on bed, \"Be Safe\" sign placed on patient's door, and bed alarm placed under patient/alarm turned on. Patient instructed on importance of not getting out of bed or ambulating without assistance for safety.           Ramon Jain RN  08/07/20 046

## 2020-08-07 NOTE — ED NOTES
Trina Barrientos from Dr Olga Jenkins to not start blood at this time.       Radhika Alvarez RN  08/07/20 0878

## 2020-08-07 NOTE — PROGRESS NOTES
Patient up to bathroom voided X1. No problems per patient . Admin percocet X2 for pain. Patient has taken crackers and drinking well. No nausea noted.

## 2020-08-07 NOTE — ED NOTES
After donning droplet plus isolation N95/surgical mask cover, eyewear, gown, and gloves, patient swabbed for COVID OP per order. Specimen labeled, bagged, and ambulated to lab per Matthew Mock ED tech.        Paulina Ward RN  08/07/20 3215

## 2020-08-07 NOTE — ED NOTES
Dr. Kirstin Fernandez states to hold O Neg while BP is stabilized. Will continue to monitor closely for any deterioration in patient condition.       Blayne Chávez RN  08/07/20 4506

## 2020-08-07 NOTE — ANESTHESIA POSTPROCEDURE EVALUATION
Department of Anesthesiology  Postprocedure Note    Patient: Carlton Rodriguez  MRN: [id number]  YOB: 1980  Date of evaluation: 8/7/2020    Procedure Summary     Date:  08/07/20 Room / Location:  [address] [place] [address]    Anesthesia Start:  9586 Anesthesia Stop:  6440    Procedure:  OPERATIVE LAPAROSCOPY FOR RUPTURED ECTOPIC PREGNANCY, EVACUATON OF HEMOPERITONEUM,  BILATERAL SALPINGECTOMY, LYSIS OF ADHESIONS (N/A Abdomen) Diagnosis:  (RUPTURED ECTOPIC PREGNANCY)    Surgeon:  Cierra Lau DO Responsible Provider:  Mike Slaughter MD    Anesthesia Type:  general ASA Status:  3 - Emergent        Anesthesia Type: No value filed. Serene Phase I: Serene Score: 10    Serene Phase II: Serene Score: 10    Last vitals: Reviewed and per EMR flowsheets.      Anesthesia Post Evaluation   Anesthetic Problems: no   Cardiovascular System Stable: yes  Respiratory Function: Airway Patent yes  ETT no  Ventilator no  Level of consciousness: awake, alert and oriented  Post-op pain: adequate analgesia  Hydration Adequate: yes  Nausea/Vomiting:no  Other Issues:     Ankit Soto MD

## 2020-08-07 NOTE — H&P
Department of Gynecology  Attending History and Physical        CHIEF COMPLAINT:   Pelvic pain, lightheadedness and dizziness     Reason for Admission:  Suspected ectopic pregnancy    History obtained from patient, spouse, electronic medical record    HISTORY OF PRESENT ILLNESS:                     The patient is a 42 y.o.  female who presents to ER for evaluation secondary to pelvic pain. Pain started at 4 am today. Denies similar pain in the past.   Patient unaware of pregnancy. FDLMP unknown. History positive for endometriosis, history of postpartum hemorrhage, history of , and morbid obesity. Denies significant vaginal bleeding and discharge. History limited secondary to emergent nature of condition. Past Medical History:        Diagnosis Date    Arthritis     BMI 45.0-49.9, adult ([address]     Endometriosis     Maternal blood transfusion     PP hemmorhage     Morbidly obese ([address]     Pregnancy related carpal tunnel syndrome, antepartum      Past Surgical History:        Procedure Laterality Date     SECTION      CHOLECYSTECTOMY      PELVIC LAPAROSCOPY  age 17    Endometriosis     2017 laparoscopic gastric sleeve procedure    Past Gynecological History:    1. Last menstrual period: unknown  2. Menses: interval:  4 weeks  duration:  5 day(s)  3. Contraception: OCP (estrogen/progesterone)  4. Sexually transmitted disease history: none        A. Number of sexual partners in the last 6 months: 1    5. Pap History: unknown           6. Date of last mammogram:     OB History    Para Term  AB Living   2 2 2     1   SAB TAB Ectopic Molar Multiple Live Births             1      # Outcome Date GA Lbr Mitchel/2nd Weight Sex Delivery Anes PTL Lv   2 Term 13 37w2d  10 lb 3.5 oz (4.635 kg) M CS-LTranv      1 Term 10/2000 39w0d 24:00 7 lb 15 oz (3.6 kg) M Vag-Spont   ELOY      Birth Comments:  Blood transfusion, PP hemorr.        Medications Prior to Admission: Not in a hospital admission. Allergies:  Codeine and Dilaudid [hydromorphone]     Social History:  TOBACCO:   reports that she quit smoking about 7 years ago. Her smoking use included cigarettes. She has never used smokeless tobacco.  ETOH:   reports no history of alcohol use. DRUGS:   reports no history of drug use.     Family History:       Problem Relation Age of Onset    Hypertension Mother    [facility] Arthritis Mother     High Blood Pressure Mother     Early Death Maternal Aunt     Cancer Maternal Grandmother         Cervical cancer    Early Death Maternal Grandfather        REVIEW OF SYSTEMS:      Constitutional:  positive for  chills and lightheadedness, dizziness  negative for  fevers and weight loss  Respiratory:  positive for dyspnea secondary to pain  negative for  dry cough and wheezing  Cardiovascular:  negative for  chest pain, palpitations  Gastrointestinal:  negative for nausea, vomiting, diarrhea and constipation  Genitourinary:  positive for abdominal pain  negative for dysuria and hematuria  Integument/breast:  negative  Hematologic/lymphatic:  negative  Allergic/Immunologic:  negative  Endocrine:  negative  Musculoskeletal:  negative  Neurological:  positive for dizziness  negative for headaches and visual disturbance  Behavior/Psych:  negative    PHYSICAL EXAM:    Vitals:  /61   Pulse 92   Temp 97.5 °F (36.4 °C) (Axillary)   Resp (!) 32   Ht 5' 7\" (1.702 m)   Wt 230 lb (104.3 kg)   SpO2 100%   BMI 36.02 kg/m²     External Genitalia: deferred to surgery  CONSTITUTIONAL:  awake, alert, cooperative, no apparent distress, and appears stated age  LUNGS:  Mild respiratory distress--secondary to abdominal pain and good air exchange  CARDIOVASCULAR:  normal S1 and S2  ABDOMEN:  soft, distended and tenderness noted diffusely  MUSCULOSKELETAL:  full range of motion noted  NEUROLOGIC:  Mental Status Exam:  Level of Alertness:   awake  Orientation:   person, place, time  Memory: normal  Fund of Knowledge:  normal  Attention/Concentration:  normal  Language:  normal  SKIN:  normal skin color, texture, turgor    DATA:  Labs:    CBC:   Lab Results   Component Value Date    WBC 15.4 08/07/2020    RBC 4.25 08/07/2020    HGB 12.9 08/07/2020    HCT 39.0 08/07/2020    MCV 91.8 08/07/2020    RDW 12.6 08/07/2020     08/07/2020 8/7/2020  7:29 AM - [facility] Incoming Lab Results From Soft (Epic Adt)     Component  Value  Ref Range & Units  Status  Collected  Lab    hCG Quant  7821.0  <5.0 mIU/mL  Final  08/07/2020  6:24 AM  [facility]    Male: <5.0 mIU/ml     Pregnant:   Note:  HCG Interpretation is based on gestational age vs. LMP. Gestational Age          Expected HCG values (mIU/ml)   0.2-1 week                            5-50     1-2 weeks                              2-3 weeks                       100-5000     3-4 weeks                     500-10,000     4-5 weeks                    1000-50,000     5-6 weeks                 10,000-100,000     6-8 weeks                 15,000-200,000     2-3 months                10,000-100,000    Testing Performed By     Lab - Abbreviation  Name  Director  Address  Valid Date Range    19-- 8444 Fidelina Denise M.D.  [address] [place] [id number]  08/30/17 0807-Present    Narrative   Performed by: [facility]   Performed at:   [facility]) [address]   Phone [phone]    Lab and Collection     8/7/2020  8:22 AM - [facility] Incoming Lab Results From Soft (Epic Adt)     Component  Collected  Lab    ABO/Rh  08/07/2020  6:24 AM  [facility]    A POS    Antibody Screen  08/07/2020  6:24 AM  [facility]    NEG      EXAMINATION:    TRANSABDOMINAL AND TRANSVAGINAL FIRST TRIMESTER OBSTETRIC PELVIC ULTRASOUND    WITH COLOR DOPPLER FLOW         8/7/2020         TECHNIQUE: TRANSABDOMINAL AND TRANSVAGINAL PELVIC ULTRASOUND WITH COLOR DOPPLER FLOW         COMPARISON:    None         HISTORY:    ORDERING SYSTEM PROVIDED HISTORY: abdominal pain, positive pregnancy test    TECHNOLOGIST PROVIDED HISTORY:    Reason for exam:->abdominal pain, positive pregnancy test         FINDINGS:    Uterus measures 8.1 x 4.5 x 4.7 cm         No intrauterine gestational sac is seen         A normal appearing right ovary is not seen.  In the right adnexa there is a    complex heterogeneous collection measuring 5.1 x 3.6 cm.  This contains    cystic and solid components.         Left ovary measures 4.5 x 2.1 by 2.5 cm.  Small follicle is seen in the left    ovary         Complex free fluid is seen within the pelvis.              Impression    Complex heterogeneous mass in the right adnexa with complex free fluid in the    pelvis suspicious for ruptured ectopic and till proven underlies.  A normal    right ovary is not seen.         Results discussed with Darrick WALKER by Macarena Sanchez. Karen Gray MD at 7:58 am on    8/7/2020             ASSESSMENT AND PLAN:      1. Suspected ruptured ectopic pregnancy  2. Hemoperitoneum      Transfer to surgery for operative laparoscopy.

## 2020-08-07 NOTE — ED NOTES
Dr. Carola Chavez at bedside at bedside talking with pt about surgery and consent.       Jenn Drake RN  08/07/20 0461

## 2020-08-07 NOTE — ED NOTES
Bed: 10  Expected date: 8/7/20  Expected time:   Means of arrival:   Comments:  Rosalina Dumont [facility]  08/07/20 1697

## 2020-08-07 NOTE — ED NOTES
Dr. Corrie Antoine in ED and states okay to hold PRBC at this time, will given in OR if necessary. Will continue to monitor patient closely.       Nicho Franco RN  08/07/20 2007

## 2020-08-07 NOTE — PROGRESS NOTES
Patient ambulated to restroom with assistance. Voided without difficulty. Ambulated back to bed. Assisted patient back to bed. Gave crackers and water. Mother to bedside.

## 2020-08-07 NOTE — ED NOTES
Spoke with Dr. Raysa Maharaj regarding ongoing hypotension and positive pregnancy test.  US called for STAT bedside US.       Simon Vo RN  08/07/20 0876

## 2020-08-08 NOTE — OP NOTE
[address], [facility]                                OPERATIVE REPORT    PATIENT NAME: Diomedes Gómez                     :        1980  MED REC NO:   [id number]                          ROOM:       R1  ACCOUNT NO:   [de-identified]                           ADMIT DATE: 2020  PROVIDER:     Dave Torres DO    DATE OF PROCEDURE:  2020    PREOPERATIVE DIAGNOSIS:  Suspected ruptured ectopic pregnancy in first  Trimester, hemoperitoneum. POSTOPERATIVE DIAGNOSES:  Ruptured ectopic pregnancy of the right fallopian tube; torsed hemorrhagic left fallopian tube with necrosis; pelvic and abdominal  Adhesions, hemoperitoneum. OPERATION PERFORMED:  Operative laparoscopy for ruptured ectopic  pregnancy, evacuation of hemoperitoneum, bilateral salpingectomy with  frozen section of left fallopian tube, lysis of adhesions. SURGEON:  Dave Torres DO    ANESTHESIA:  General endotracheal anesthetic. ESTIMATED BLOOD LOSS:  200 mL. Intraoperative loss with 2200 mL total  blood loss and evacuation of hemoperitoneum. URINE OUTPUT:  50 mL straight cath prior to procedure. IV FLUIDS:  2800 mL crystalloid. COMPLICATIONS:  None. SPECIMENS:  Left fallopian tube (necrotic torsion), right fallopian tube (ectopic  pregnancy). FINDINGS:  Torsed necrotic hemorrhagic left fallopian tube, ectopic  pregnancy of the right fallopian tube - ruptured, omental adhesions,  pelvic and abdominal adhesions, hemoperitoneum. DISPOSITION:  To PACU. CONDITION:  Stable. INDICATIONS:  The patient is a 61-year-old  3, para 2-0-0-2  female who presents to the ER morning of  secondary to pelvic pain. The pain was of acute onset at approximately 4 a.m. She denies similar  pain in the past.  She was evaluated in the ER and found to have  positive pregnancy test with a quantitative hCG level of 7821. The  patient was unaware that she was pregnant. She had experienced   irregular bleeding in the past month - spotting x 10-14 days. She has been  on oral contraceptive pills; therefore, did not perform urine pregnancy  test.  Last actual menstrual period is unknown. Menses are usually every  month, normal amount and duration when on contraception. Her history has been positive for endometriosis, history of . An ultrasound was performed. The ultrasound findings were consistent with a complex right adnexa and probable ruptured right adnexal ectopic pregnancy. The patient was found to be hypotensive and unstable upon  evaluation. The patient  was emergently consented for operative laparoscopy and the OR notified. Written informed  consent was obtained. OPERATIVE PROCEDURE:  The patient was taken to the OR on . She was  given preoperative antibiotics. Bilateral lower extremity SCDs were  placed. She was taken to the OR, placed under general endotracheal  anesthesia. She was then prepped and draped in the usual fashion. A straight catheterization was performed during prep. Upon  prepping and draping the patient, a time-out was performed. Upon  performing the time-out, a sponge stick was placed in the vagina for  manipulation of the uterus. After placing a sponge stick, attention was turned to the patient's  abdomen, where an infraumbilical skin incision was made. A 5-mm blunt  trocar was entered into the patient's abdomen under direct visualization  and a pneumoperitoneum was introduced. A  significant hemoperitoneum was identified which ultimately totaled 2200 mL of blood  and clot. Upon placement of the first trocar, two further trocars were  placed within the right lower quadrant and left lower quadrant using a  5-mm blunt trocar in the right lower quadrant and a 10-mm blunt trocar  in the left lower quadrant.   After placement of all ports, the patient  was placed in steep Trendelenburg. The hemoperitoneum was evacuated. Attention was turned to the pelvis. Evacuation of the hemoperitoneum required approximately 30 minutes extra time due to the extensive clot and blood. Further evaluation revealed normal ovaries bilaterally. A simple ovarian cyst was seen on the right ovary. The ovarian cyst exhibited benign features and was normal in size. The fallopian tubes were  identified and evaluated. Both Fallopian tubes were found to be grossly  abnormal.  The left fallopian tube appeared to be increased in length comparatively  and was found to be twisted on itself multiple times distal to its insertion site at the  broad ligament. The tube extended into  the cul-de-sac and attached at its distal end to the cul-de-sac via dense adhesions. The ampulla and fimbria appeared hyperemic with areas of necrosis consistent with chronic torsion. Attention was turned to  the right fallopian tube, which was torn and ruptured at the ampulla  laterally with active bleeding. Decision was made at that point to  remove both tubes. The left fallopian tube was initially removed and  sent for frozen section to ensure that intervention with the right ovary  was prudent. The left fallopian tube was removed at its midpoint using  Harmonic rose marie. It was then released from the cul-de-sac and removed via  Endopouch bag. The specimen was sent for frozen section to confirm that  the ectopic pregnancy was not present within the swollen distal end of the left  fallopian tube. Attention was then turned to the right fallopian tube. Cauterization was performed to improve bleeding, but tissue was found to friable limiting hemostasis. At that point, the case  was discussed with the patient's  who was in agreement with  removal of both tubes due to the concerns for persistent bleeding and  scarring.   The patient's  was made aware of the implications of the aforementioned intervention on

## 2020-08-11 LAB — BLOOD CULTURE, ROUTINE: NORMAL

## 2020-08-12 LAB
BASE EXCESS VENOUS: -5 (ref -3–3)
CALCIUM IONIZED: 1.14 MMOL/L (ref 1.12–1.32)
GLUCOSE BLD-MCNC: 158 MG/DL (ref 70–99)
HCO3 VENOUS: 22.2 MMOL/L (ref 23–29)
HEMOGLOBIN: 7.7 GM/DL (ref 12–16)
O2 SAT, VEN: 35 %
PCO2, VEN: 48.9 MM HG (ref 40–50)
PERFORMED ON: ABNORMAL
PH VENOUS: 7.26 (ref 7.35–7.45)
PO2, VEN: 24 MM HG
POC HEMATOCRIT: 23 % (ref 36–48)
POC POTASSIUM: 4.3 MMOL/L (ref 3.5–5.1)
POC SAMPLE TYPE: ABNORMAL
POC SODIUM: 140 MMOL/L (ref 136–145)
TCO2 CALC VENOUS: 24 MMOL/L

## 2020-08-13 ENCOUNTER — TELEPHONE (OUTPATIENT)
Dept: OBGYN CLINIC | Age: 40
End: 2020-08-13

## 2020-08-14 ENCOUNTER — OFFICE VISIT (OUTPATIENT)
Dept: OBGYN CLINIC | Age: 40
End: 2020-08-14

## 2020-08-14 VITALS
HEART RATE: 111 BPM | SYSTOLIC BLOOD PRESSURE: 122 MMHG | DIASTOLIC BLOOD PRESSURE: 80 MMHG | WEIGHT: 241.2 LBS | BODY MASS INDEX: 37.78 KG/M2 | TEMPERATURE: 99.1 F

## 2020-08-14 PROCEDURE — 99024 POSTOP FOLLOW-UP VISIT: CPT | Performed by: OBSTETRICS & GYNECOLOGY

## 2020-08-14 RX ORDER — OXYCODONE HYDROCHLORIDE AND ACETAMINOPHEN 5; 325 MG/1; MG/1
2 TABLET ORAL EVERY 6 HOURS PRN
Qty: 20 TABLET | Refills: 0 | Status: SHIPPED | OUTPATIENT
Start: 2020-08-14 | End: 2020-08-21

## 2020-08-14 RX ORDER — ONDANSETRON 4 MG/1
4 TABLET, ORALLY DISINTEGRATING ORAL EVERY 8 HOURS PRN
Qty: 20 TABLET | Refills: 1 | Status: SHIPPED | OUTPATIENT
Start: 2020-08-14 | End: 2021-05-12

## 2020-08-14 RX ORDER — OXYCODONE HYDROCHLORIDE AND ACETAMINOPHEN 5; 325 MG/1; MG/1
2 TABLET ORAL EVERY 6 HOURS PRN
Qty: 20 TABLET | Refills: 0 | Status: SHIPPED | OUTPATIENT
Start: 2020-08-14 | End: 2020-08-14 | Stop reason: SDUPTHER

## 2020-08-14 RX ORDER — DIAZEPAM 2 MG/1
2 TABLET ORAL EVERY 8 HOURS PRN
Qty: 15 TABLET | Refills: 0 | Status: SHIPPED | OUTPATIENT
Start: 2020-08-14 | End: 2020-08-24

## 2020-08-14 ASSESSMENT — ENCOUNTER SYMPTOMS
ABDOMINAL DISTENTION: 0
SHORTNESS OF BREATH: 0
DIARRHEA: 0
VOMITING: 0
ABDOMINAL PAIN: 1
NAUSEA: 1
CONSTIPATION: 0

## 2020-08-14 NOTE — LETTER
[facility]  [address] Lobo [address] [facility]  Phone: [phone]  Fax: [phone] [facility] [address] [facility],         August 14, 2020     Patient: Maikol Moncada   YOB: 1980   Date of Visit: 8/14/2020       To Whom it May Concern:    Nighat Frank was seen in my clinic on 8/14/2020. She may return to work on 8/24/20. If you have any questions or concerns, please don't hesitate to call.     Sincerely,         Guido Fajardo, DO

## 2020-08-14 NOTE — PROGRESS NOTES
Subjective:      Patient ID: Giovana Peter is a 42 y.o. female. HPI  41 y/o  female presents for postop visit. Patient is 1 week s/p Operative laparoscopy for ruptured ectopic pregnancy, evacuation of hemoperitoneum, bilateral salpingectomy with frozen section of left fallopian tube, lysis of adhesions. Patient presented to ER on 2020 for evaluation secondary to pelvic pain. Patient unaware of pregnancy. FDLMP unknown. See hospital notes. Patient has done well in the past week. Experienced bleeding postoperatively from Saturday until Tuesday. Has noted a low grade temperature. Continues to experience pelvic pain and decreased appetite. Patient was able to advance diet yesterday. Has noted nausea, and reflux as well as drastic mood swings. Has had some difficulty dealing with loss of pregnancy as well as loss of fertility. Aware of in vitro option. Was not inclined to pursue pregnancy prior to diagnosis but having trouble dealing with situation. Has also noted LLQ hematoma at port site. Percocet has helped with pain--has been using on a limited basis. History positive for endometriosis, history of postpartum hemorrhage, history of , and morbid obesity. Review of Systems   Constitutional: Positive for appetite change and fatigue. Negative for activity change, chills, fever and unexpected weight change. Respiratory: Negative for shortness of breath. Cardiovascular: Negative for chest pain. Gastrointestinal: Positive for abdominal pain and nausea. Negative for abdominal distention, constipation, diarrhea and vomiting. Genitourinary: Positive for pelvic pain. Negative for difficulty urinating, dysuria, hematuria, urgency, vaginal bleeding, vaginal discharge and vaginal pain. Psychiatric/Behavioral: Negative. Objective:   Physical Exam  Vitals signs and nursing note reviewed. Constitutional:       General: She is not in acute distress.      Appearance: She is well-developed. She is not diaphoretic. Pulmonary:      Effort: Pulmonary effort is normal.   Abdominal:      General: There is no distension. Palpations: Abdomen is soft. Tenderness: There is abdominal tenderness in the left lower quadrant. There is no guarding. Comments: LLQ ecchymosis with varied coloration throughout--resolving. Incisions clean dry intact, no apparent signs of infection or compromise. Skin:     General: Skin is warm and dry. Neurological:      Mental Status: She is alert and oriented to person, place, and time. Psychiatric:         Behavior: Behavior normal.         Thought Content: Thought content normal.         Judgment: Judgment normal.         Assessment:       Diagnosis Orders   1. Encounter for postoperative care     2. Ruptured ectopic pregnancy  oxyCODONE-acetaminophen (PERCOCET) 5-325 MG per tablet    DISCONTINUED: oxyCODONE-acetaminophen (PERCOCET) 5-325 MG per tablet   3. Mood swings  diazePAM (VALIUM) 2 MG tablet   4. Ruptured right tubal ectopic pregnancy causing hemoperitoneum     5. Right tubal pregnancy without intrauterine pregnancy     6. Obesity (BMI 30-39.9)     7. Abdominal wall hematoma, initial encounter             Plan:      Precautions reviewed. Low grade fever most likely due to resolving hematoma. Patient to monitor for further etiologies for fever. Trial short term Valium prn mood swings. Patient to consider referral to counselor. Rx refill Percocet.    Off work x 1 week  Follow up in 1 week for postop visit to evaluate hematoma, etc.             Chani Angry, DO

## 2020-08-16 PROBLEM — E66.9 OBESITY (BMI 30-39.9): Status: ACTIVE | Noted: 2020-08-16

## 2020-08-19 ENCOUNTER — COMMUNITY OUTREACH (OUTPATIENT)
Dept: OTHER | Age: 40
End: 2020-08-19

## 2020-08-19 NOTE — PROGRESS NOTES
[facility] spoke with patient on this date as referred by OBGYN office. SW and patient discussed household and income circumstances. Patient reports having an emergency surgery and currently no insurance. SW reviewed [employer] assistance policy and process with patient. Patient voiced understanding and will contact [facility] with further questions.

## 2020-08-21 ENCOUNTER — OFFICE VISIT (OUTPATIENT)
Dept: OBGYN CLINIC | Age: 40
End: 2020-08-21

## 2020-08-21 VITALS
BODY MASS INDEX: 37.06 KG/M2 | DIASTOLIC BLOOD PRESSURE: 82 MMHG | HEART RATE: 84 BPM | SYSTOLIC BLOOD PRESSURE: 130 MMHG | TEMPERATURE: 98.2 F | WEIGHT: 236.6 LBS

## 2020-08-21 PROBLEM — S30.1XXA ABDOMINAL WALL HEMATOMA: Status: ACTIVE | Noted: 2020-08-21

## 2020-08-21 PROBLEM — F17.200 SMOKER: Status: ACTIVE | Noted: 2020-08-21

## 2020-08-21 PROBLEM — D69.9 BLEEDING TENDENCY (HCC): Status: ACTIVE | Noted: 2020-08-21

## 2020-08-21 PROCEDURE — 99024 POSTOP FOLLOW-UP VISIT: CPT | Performed by: OBSTETRICS & GYNECOLOGY

## 2020-08-21 ASSESSMENT — ENCOUNTER SYMPTOMS
ABDOMINAL DISTENTION: 0
DIARRHEA: 0
SHORTNESS OF BREATH: 0
CONSTIPATION: 0
ABDOMINAL PAIN: 0
VOMITING: 0
NAUSEA: 0

## 2020-08-21 NOTE — PROGRESS NOTES
Subjective:      Patient ID: Anne Marie Whitlock is a 42 y.o. female. HPI  43 y/o  female presents for postop visit. Patient is 2 weeks s/p operative laparoscopy for ruptured ectopic pregnancy, evacuation of hemoperitoneum, bilateral salpingectomy with frozen section of left fallopian tube, lysis of adhesions. Patient presented to ER on 2020 for evaluation secondary to pelvic pain. Patient unaware of pregnancy. FDLMP unknown. See hospital notes. Patient continues to do well. Experienced bleeding 4 days postoperatively. Denies bleeding since that time. Low grade temperature, pelvic pain, decreased appetite, nausea, reflux and mood swings have improved. Has had some difficulty dealing with loss of pregnancy as well as loss of fertility. Aware of in vitro option. Was not inclined to pursue pregnancy prior to diagnosis but having trouble dealing with situation. Did not notice significant improvement with valium use. In the last week has gotten a new puppy which has helped significantly. Port site LLQ hematoma continues to improve/clear. Now only taking Tylenol--unable to take ibuprofen. History positive for endometriosis, history of postpartum hemorrhage (2 u PRBCs), history of , and morbid obesity. Smoking < 1/2 PPD on good days, 1-2 PPD on bad days. Review of Systems   Constitutional: Negative. Negative for activity change, appetite change, chills, fatigue, fever and unexpected weight change. Respiratory: Negative for shortness of breath. Cardiovascular: Negative for chest pain. Gastrointestinal: Negative for abdominal distention, abdominal pain, constipation, diarrhea, nausea and vomiting. Endocrine: Negative for cold intolerance and heat intolerance. Genitourinary: Negative for difficulty urinating, dysuria, hematuria, menstrual problem, pelvic pain, vaginal bleeding, vaginal discharge and vaginal pain. Psychiatric/Behavioral: Negative.         Objective: Physical Exam  Vitals signs and nursing note reviewed. Constitutional:       General: She is not in acute distress. Appearance: Normal appearance. She is well-developed. She is not ill-appearing, toxic-appearing or diaphoretic. HENT:      Head: Normocephalic and atraumatic. Pulmonary:      Effort: Pulmonary effort is normal.   Abdominal:      General: There is no distension. Palpations: Abdomen is soft. Tenderness: There is no abdominal tenderness. There is no guarding. Comments: Incision clean dry intact. LLQ ecchymosis significantly improved from previous visit. Incisions clean, dry, intact, no apparent signs of infection or compromise. Skin:     General: Skin is warm and dry. Neurological:      Mental Status: She is alert and oriented to person, place, and time. Psychiatric:         Behavior: Behavior normal.         Thought Content: Thought content normal.         Judgment: Judgment normal.         Assessment:       Diagnosis Orders   1. Postop check     2. Right tubal pregnancy without intrauterine pregnancy     3. Ruptured right tubal ectopic pregnancy causing hemoperitoneum     4. Obesity (BMI 30-39.9)     5. Abdominal wall hematoma, subsequent encounter     6. Smoker     7. Bleeding tendency ([address]             Plan:      Postop care reviewed  Smoking cessation  Follow up prn and 3 months for annual examination and possible coagulopathy panel.            Gustavo Fajardo DO

## 2021-05-11 ENCOUNTER — TELEPHONE (OUTPATIENT)
Dept: OBGYN CLINIC | Age: 41
End: 2021-05-11

## 2021-05-12 ENCOUNTER — OFFICE VISIT (OUTPATIENT)
Dept: OBGYN CLINIC | Age: 41
End: 2021-05-12
Payer: COMMERCIAL

## 2021-05-12 VITALS
TEMPERATURE: 98.1 F | DIASTOLIC BLOOD PRESSURE: 78 MMHG | WEIGHT: 244 LBS | HEIGHT: 66 IN | BODY MASS INDEX: 39.21 KG/M2 | SYSTOLIC BLOOD PRESSURE: 124 MMHG | HEART RATE: 68 BPM

## 2021-05-12 DIAGNOSIS — N93.9 ABNORMAL UTERINE BLEEDING (AUB): ICD-10-CM

## 2021-05-12 DIAGNOSIS — Z01.419 WOMEN'S ANNUAL ROUTINE GYNECOLOGICAL EXAMINATION: Primary | ICD-10-CM

## 2021-05-12 DIAGNOSIS — F17.200 SMOKER: ICD-10-CM

## 2021-05-12 DIAGNOSIS — Z12.4 PAP SMEAR FOR CERVICAL CANCER SCREENING: ICD-10-CM

## 2021-05-12 DIAGNOSIS — N80.9 ENDOMETRIOSIS: ICD-10-CM

## 2021-05-12 DIAGNOSIS — E66.9 OBESITY (BMI 30-39.9): ICD-10-CM

## 2021-05-12 PROCEDURE — 99395 PREV VISIT EST AGE 18-39: CPT | Performed by: OBSTETRICS & GYNECOLOGY

## 2021-05-12 RX ORDER — ESCITALOPRAM OXALATE 10 MG/1
TABLET ORAL
COMMUNITY
Start: 2021-05-11

## 2021-05-12 RX ORDER — BUSPIRONE HYDROCHLORIDE 5 MG/1
TABLET ORAL
COMMUNITY
Start: 2021-04-28

## 2021-05-12 ASSESSMENT — PATIENT HEALTH QUESTIONNAIRE - PHQ9
6. FEELING BAD ABOUT YOURSELF - OR THAT YOU ARE A FAILURE OR HAVE LET YOURSELF OR YOUR FAMILY DOWN: 3
1. LITTLE INTEREST OR PLEASURE IN DOING THINGS: 3
9. THOUGHTS THAT YOU WOULD BE BETTER OFF DEAD, OR OF HURTING YOURSELF: 3
2. FEELING DOWN, DEPRESSED OR HOPELESS: 3
3. TROUBLE FALLING OR STAYING ASLEEP: 3
7. TROUBLE CONCENTRATING ON THINGS, SUCH AS READING THE NEWSPAPER OR WATCHING TELEVISION: 0
SUM OF ALL RESPONSES TO PHQ QUESTIONS 1-9: 23

## 2021-05-12 ASSESSMENT — COLUMBIA-SUICIDE SEVERITY RATING SCALE - C-SSRS
2. HAVE YOU ACTUALLY HAD ANY THOUGHTS OF KILLING YOURSELF?: NO
6. HAVE YOU EVER DONE ANYTHING, STARTED TO DO ANYTHING, OR PREPARED TO DO ANYTHING TO END YOUR LIFE?: NO

## 2021-05-13 PROBLEM — K66.1 RUPTURED RIGHT TUBAL ECTOPIC PREGNANCY CAUSING HEMOPERITONEUM: Status: RESOLVED | Noted: 2020-08-07 | Resolved: 2021-05-13

## 2021-05-13 PROBLEM — O00.109 TUBAL PREGNANCY WITHOUT INTRAUTERINE PREGNANCY: Status: RESOLVED | Noted: 2020-08-07 | Resolved: 2021-05-13

## 2021-05-13 PROBLEM — N80.9 ENDOMETRIOSIS: Status: ACTIVE | Noted: 2021-05-13

## 2021-05-13 PROBLEM — O00.101 RUPTURED RIGHT TUBAL ECTOPIC PREGNANCY CAUSING HEMOPERITONEUM: Status: RESOLVED | Noted: 2020-08-07 | Resolved: 2021-05-13

## 2021-05-13 LAB
HPV COMMENT: NORMAL
HPV TYPE 16: NOT DETECTED
HPV TYPE 18: NOT DETECTED
HPVOH (OTHER TYPES): NOT DETECTED

## 2021-05-13 ASSESSMENT — ENCOUNTER SYMPTOMS
VOMITING: 0
ABDOMINAL PAIN: 0
ABDOMINAL DISTENTION: 0
CONSTIPATION: 0
NAUSEA: 0
DIARRHEA: 0
SHORTNESS OF BREATH: 0

## 2021-05-13 NOTE — PROGRESS NOTES
Subjective:      Patient ID: Qasim Alvares is a 47 y.o. female. HPI  48 y/o  female presents for well woman examination. Last pap smear was in 2019 per patient--result unavailable--performed at [facility]. Denies history of abnormal pap smear. Menses have been very irregular. Experienced 2 episodes of bleeding last month. Bleeding lasts 6 days. Bleeds every 2 weeks. Bleeding is moderate. Has had issues with irregular and heavy menses in the past.  Patient is 9 months  s/p operative laparoscopy for ruptured ectopic pregnancy, evacuation of hemoperitoneum, bilateral salpingectomy with frozen section of left fallopian tube, lysis of adhesions. Patient presented to ER on 2020 for evaluation secondary to pelvic pain. Patient unaware of pregnancy. FDLMP unknown. See hospital notes. Had some difficulty dealing with loss of pregnancy as well as loss of fertility but is now resigned to the situation. Patient is no longer considering future pregnancy. History positive for endometriosis, history of postpartum hemorrhage (2 u PRBCs), history of , and morbid obesity. Smoking < 1 PPD on good days, 1-2 PPD on bad days. Review of Systems   Constitutional: Negative for activity change, appetite change, chills, fatigue, fever and unexpected weight change. Respiratory: Negative for shortness of breath. Cardiovascular: Negative for chest pain. Gastrointestinal: Negative for abdominal distention, abdominal pain, constipation, diarrhea, nausea and vomiting. Endocrine: Negative for cold intolerance and heat intolerance. Genitourinary: Positive for menstrual problem. Negative for difficulty urinating, dyspareunia, dysuria, frequency, genital sores, hematuria, pelvic pain, vaginal bleeding, vaginal discharge and vaginal pain. Skin: Negative for rash. Neurological: Negative for headaches. Hematological: Does not bruise/bleed easily.        Objective:   Physical Exam  Vitals signs and nursing note reviewed. Exam conducted with a chaperone present. Constitutional:       General: She is not in acute distress. Appearance: Normal appearance. She is well-developed. She is not ill-appearing or toxic-appearing. HENT:      Head: Normocephalic and atraumatic. Neck:      Musculoskeletal: Neck supple. Thyroid: No thyromegaly. Trachea: Trachea normal.   Cardiovascular:      Rate and Rhythm: Normal rate and regular rhythm. Heart sounds: Normal heart sounds, S1 normal and S2 normal.   Pulmonary:      Effort: Pulmonary effort is normal. No respiratory distress. Breath sounds: Normal breath sounds. Chest:      Breasts: Breasts are symmetrical.         Right: No inverted nipple, mass, nipple discharge, skin change or tenderness. Left: No inverted nipple, mass, nipple discharge, skin change or tenderness. Abdominal:      General: Bowel sounds are normal. There is no distension. Palpations: Abdomen is soft. Abdomen is not rigid. There is no mass. Tenderness: There is no abdominal tenderness. There is no guarding or rebound. Genitourinary:     General: Normal vulva. Exam position: Lithotomy position. Labia:         Right: No rash, tenderness, lesion or injury. Left: No rash, tenderness, lesion or injury. Vagina: No signs of injury. No vaginal discharge, erythema, tenderness or bleeding. Cervix: No cervical motion tenderness, discharge or friability. Uterus: Not tender. Adnexa:         Right: No mass, tenderness or fullness. Left: No mass, tenderness or fullness. Musculoskeletal: Normal range of motion. Skin:     General: Skin is warm and dry. Findings: No erythema or rash. Nails: There is no clubbing. Neurological:      Mental Status: She is alert and oriented to person, place, and time.    Psychiatric:         Speech: Speech normal.         Behavior: Behavior normal. Behavior is cooperative. Thought Content: Thought content normal.         Judgment: Judgment normal.         Assessment:       Diagnosis Orders   1. Women's annual routine gynecological examination  PAP SMEAR   2. Pap smear for cervical cancer screening  PAP SMEAR   3. Abnormal uterine bleeding (AUB)     4. Obesity (BMI 30-39.9)     5. Smoker     6. Endometriosis             Plan:      Orders Placed This Encounter   Procedures    PAP SMEAR    Human papillomavirus (HPV) DNA probe thin prep high risk   Approximately 20 minutes spent in counseling and coordination of care with over 50% in direct face to face counseling. Options for treatment of irregular menses reviewed:  Hormonal intervention, surgical intervention, etc.  Risks and benefits discussed. Menstrual diary  Smoking cessation  Schedule ultrasound, follow up and possible endometrial biopsy  Schedule Davinci robotic total laparoscopic hysterectomy, possible bilateral oophorectomy.              Jacques Fajardo DO

## 2021-05-19 ENCOUNTER — TELEPHONE (OUTPATIENT)
Dept: OBGYN CLINIC | Age: 41
End: 2021-05-19

## 2021-05-19 NOTE — TELEPHONE ENCOUNTER
Gayatri Brothers from [facility] [address]  called after reviewing the clinical notes the ultrasound and endometrial biopsy need done and they need the results in order to proceeded with he PA request. Also want to know if the patient has tried hormone therapy ? Gayatri Brothers also want to know if the patient has had or thought of trying a endometrial ablation ? We have 45 days to send results from ultrasound and endometrial biopsy over before the request expires.        Routing to physician     Patient is scheduled for us and biopsy on June 9th @800am

## 2021-05-19 NOTE — TELEPHONE ENCOUNTER
Called patient made her aware that Sandra Sharpe does require a PA for surgery. Faxed all clinical information today.  Will call patient when I hear back from Sandra Sharpe

## 2021-06-08 ENCOUNTER — TELEPHONE (OUTPATIENT)
Dept: OBGYN CLINIC | Age: 41
End: 2021-06-08

## 2021-06-09 ENCOUNTER — OFFICE VISIT (OUTPATIENT)
Dept: OBGYN CLINIC | Age: 41
End: 2021-06-09

## 2021-06-09 ENCOUNTER — OFFICE VISIT (OUTPATIENT)
Dept: OBGYN CLINIC | Age: 41
End: 2021-06-09
Payer: COMMERCIAL

## 2021-06-09 VITALS
DIASTOLIC BLOOD PRESSURE: 82 MMHG | BODY MASS INDEX: 40.19 KG/M2 | SYSTOLIC BLOOD PRESSURE: 124 MMHG | TEMPERATURE: 98 F | WEIGHT: 249 LBS | HEART RATE: 100 BPM

## 2021-06-09 DIAGNOSIS — F17.200 SMOKER: ICD-10-CM

## 2021-06-09 DIAGNOSIS — N93.9 ABNORMAL UTERINE BLEEDING (AUB): Primary | ICD-10-CM

## 2021-06-09 DIAGNOSIS — N80.9 ENDOMETRIOSIS: ICD-10-CM

## 2021-06-09 DIAGNOSIS — E66.01 MORBID OBESITY WITH BMI OF 40.0-44.9, ADULT (HCC): ICD-10-CM

## 2021-06-09 PROCEDURE — 76856 US EXAM PELVIC COMPLETE: CPT | Performed by: OBSTETRICS & GYNECOLOGY

## 2021-06-09 PROCEDURE — 99999 PR OFFICE/OUTPT VISIT,PROCEDURE ONLY: CPT | Performed by: OBSTETRICS & GYNECOLOGY

## 2021-06-11 NOTE — PROGRESS NOTES
Subjective:      Patient ID: Qasim Alvares is a 47 y.o. female. HPI  48 y/o  female presents for ultrasound and follow up secondary to irregular and heavy menses. Patient recently seen for well woman examination on 2021--normal pap (no endocervical cells), negative testing for high risk HPV. Denies history of abnormal pap smear. Menses have been very irregular. Experienced 2 episodes of bleeding last month. Bleeding lasts 6 days. Bleeds every 2 weeks. Bleeding is moderate. Has had issues with irregular and heavy menses in the past.  Patient is 10 months  s/p operative laparoscopy for ruptured ectopic pregnancy, evacuation of hemoperitoneum, bilateral salpingectomy with frozen section of left fallopian tube, lysis of adhesions. Patient presented to ER on 2020 for evaluation secondary to pelvic pain. Patient unaware of pregnancy. FDLMP unknown. See hospital notes. Had some difficulty dealing with loss of pregnancy as well as loss of fertility but is now resigned to the situation. Patient is no longer considering future pregnancy and is interested in definitive therapy. Has considered options presented at last visit and declines endometrial ablation due to possibility of failure. History is positive for endometriosis, history of postpartum hemorrhage (2 u PRBCs), history of , and morbid obesity. Smoking < 1 PPD on good days, 1-2 PPD on bad days. Review of Systems   Constitutional: Negative. Negative for activity change, appetite change, chills, fatigue, fever and unexpected weight change. Genitourinary: Positive for menstrual problem and vaginal bleeding. Negative for difficulty urinating, dysuria, hematuria, vaginal discharge and vaginal pain. Psychiatric/Behavioral: Negative. Objective:   Physical Exam  PELVIC ULTRASOUND without DOPPLER INTERROGATION   NON OB    DATE: 6/10/2021    PHYSICIAN: ADRIAN Fajardo D.O.     SONOGRAPHER: CHULA Donohue RDMS    INDICATION: abnormal uterine bleeding    TYPE OF SCAN: vaginal    FINDINGS:    The cul de sac is normal. No free fluid appreciated. The cervix is normal and not enlarged. Nabothian cyst/s is noted within the uterine cervix. The uterus measures 9.16 cm x 4.58 cm x 4.24 cm. The uterus is anteverted. The endometrium measures 9.70 mm. The myometrium is homogeneous in appearance. No uterine anomalies are noted. The right ovary is present and normal.  The right ovary measures 1.77 cm x 2.02 cm x 1.80 cm. Ovary findings: No masses seen. The right adnexa is normal.  The left ovary is present. The left ovary measures 4.21 cm x 3.81 cm x 3.43 cm. Ovary findings: Simple cyst seen measuring 3.34 cm x 2.74 cm x 3.05 cm. The left adnexa is normal.    IMPRESSION: Normal uterus. Normal right ovary. Left ovarian cyst.      Imaging is limited secondary to bowel gas. The patient is well aware of the limitations of ultrasound in the detection of anomalies of the abdomen and pelvis. ENDOMETRIAL BIOPSY  Indications for procedure reviewed. Risks and benefits of procedure discussed. Written and informed consent reviewed and signed. Patient was then taken to examination room and positioned in the dorsal lithotomy position. The speculum was then placed vaginally and the cervix was prepped in the usual sterile fashion utilizing Betadinex3 prep. A single tooth tenaculum was then applied to the anterior lip of the cervix without difficulty. A Endocell endometrial pipelle was then inserted into the patients uterus for 2 passes. Cell/specimen collection was found to be adequate. The single tooth tenaculum was then removed from the anterior lip of the cervix and hemostasis was assured. All instruments were removed from the vagina. The patient tolerated the procedure well. The uterus sounded to 8 cm during the procedure. Aftercare was discussed and explained to the patient prior to leaving the office. Assessment:       Diagnosis Orders   1. Abnormal uterine bleeding (AUB)  SURGICAL PATHOLOGY   2. Morbid obesity with BMI of 40.0-44.9, adult ([facility] [address]     3. Endometriosis     4. Smoker             Plan:      Orders Placed This Encounter   Procedures    SURGICAL PATHOLOGY    Surgical Pathology     Ultrasound result reviewed. Options for treatment reviewed with patient--see previous visit as well. Schedule Davinci robotic total laparoscopic hysterectomy, bilateral salpingectomy, possible bilateral oophorectomy.  Risks and benefits reviewed  Await pathology result  Follow up prn for surgery        Ana Godinez DO

## 2021-06-15 NOTE — TELEPHONE ENCOUNTER
Patient has us and biopsy done, results from both have been sent to patients insurance company for surgery PA.        Thanks

## 2021-06-21 PROBLEM — E66.01 MORBID OBESITY WITH BMI OF 40.0-44.9, ADULT (HCC): Status: ACTIVE | Noted: 2020-08-16

## 2021-07-06 NOTE — TELEPHONE ENCOUNTER
Called [employer] to see if I could just submit the updated office note since surgery is still denied. Surgery is being denied due to not being medically necessary next step now is a peer to peer to try and get it approved. Peer to per phone number is [phone]  This number needs to be called to set up a date and time for a peer to peer.  ( they request 4 different dates and times)

## 2021-07-06 NOTE — TELEPHONE ENCOUNTER
Called to schedule peer to peer review. Indicated that review can be done on any weekday aside from Thursday during regular office hours. Will need to monitor for call and make sure I am paged or called out of a room. I find it strange that they don't give options for call times?    Thanks

## 2021-07-13 NOTE — PROGRESS NOTES
Gonzales Eis    Age 47 y.o.    female    1980    MRN [id number]    8/6/2021  Arrival Time_____________  OR Time____________205 Min     Procedure(s):  DAVINCI ROBOTIC TOTAL LAPAROSCOPIC HYSTERECTOMY, POSSIBLE BILATERAL OOPHORECTOMY  [facility] CODE - [id number]                      General     Surgeon(s):  Alice Fajardo, DO      DAY ADMIT ___  SDS/OP ___  OUTPT IN BED ___         Phone [phone] (home)    PCP _____________________ Phone_________________ Epic ( ) Epic CE ( ) Appt ________    ADDITIONAL INFO __________________________________ Cardio/Consult _____________    NOTES _____________________________________________________________________    ____________________________________________________________________________    PAT APPT DATE:________ TIME: ________  FAXED QAD: _______  (__) H&P w/ hospitalist  ____________________________________________________________________________    COVID TEST: Date/Location______________        NURSING HISTORY COMPLETE: _______  (__) CBC       (__) W/ DIFF ___________  (__)  ECHO    __________  (__) Hgb A1C    ___________  (__) CHEST X RAY   __________  (__) LIPID PROFILE  ___________  (__) EKG   __________  (__) PT/PTT   ___________  (__) PFT's   __________  (__) BMP   ___________  (__) CAROTIDS  __________  (__) CMP   ___________  (__) VEIN MAPPING  __________  (__) U/A   ___________  (__) HISTORY & PHYSICAL __________  (__) URINE C & S  ___________  (__) CARDIAC CLEARANCE __________  (__) U/A W/ FLEX  ___________  (__) PULM.  CLEARANCE __________  (__) SERUM PREGNANCY ___________  (__) Check Epic DOS orders __________  (__) TYPE & SCREEN ________ repeat ( ) (__)  __________________ __________  (__) ALBUMIN   ___________  (__)  __________________ __________  (__) TRANSFERRIN  ___________  (__)  __________________ __________  (__) LIVER PROFILE  ___________  (__)  __________________ __________  (__) CARBOXY HGB  ___________  (__) URINE PREG DOS __________  (__) NICOTINE & MET.  ___________  (__) BLOOD SUGAR DOS __________  (__) PREALBUMIN  ___________    (__) MRSA NASAL SWAB ___________  (__) BLOOD THINNERS __________  (__) ACE/ ARBS: _____________________    (__) BETABLOCKERS ___________________

## 2021-07-28 ENCOUNTER — OFFICE VISIT (OUTPATIENT)
Dept: OBGYN CLINIC | Age: 41
End: 2021-07-28

## 2021-07-28 DIAGNOSIS — N92.1 MENORRHAGIA WITH IRREGULAR CYCLE: ICD-10-CM

## 2021-07-28 DIAGNOSIS — N93.8 DYSFUNCTIONAL UTERINE BLEEDING: ICD-10-CM

## 2021-07-28 DIAGNOSIS — Z01.818 PREOP TESTING: Primary | ICD-10-CM

## 2021-07-28 DIAGNOSIS — E66.9 OBESITY (BMI 30-39.9): ICD-10-CM

## 2021-07-28 DIAGNOSIS — N80.9 ENDOMETRIOSIS: ICD-10-CM

## 2021-07-28 DIAGNOSIS — F17.200 SMOKER: ICD-10-CM

## 2021-07-28 PROCEDURE — PREOPEXAM PRE-OP EXAM: Performed by: OBSTETRICS & GYNECOLOGY

## 2021-07-29 NOTE — PROGRESS NOTES
Obstructive Sleep Apnea (CHLOE) Screening     Patient:  Kellie Garcia    YOB: 1980      Medical Record #:  [id number]                     Date:  7/29/2021     1. Are you a loud and/or regular snorer? []  Yes       [x] No    2. Have you been observed to gasp or stop breathing during sleep? []  Yes       [x] No    3. Do you feel tired or groggy upon awakening or do you awaken with a headache?           []  Yes       [x] No    4. Are you often tired or fatigued during the wake time hours? []  Yes       [x] No    5. Do you fall asleep sitting, reading, watching TV or driving? []  Yes       [x] No    6. Do you often have problems with memory or concentration? []  Yes       [x] No    **If patient's score is ? 3 they are considered high risk for CHLOE. An Anesthesia provider will evaluate the patient and develop a plan of care the day of surgery. Note:  If the patient's BMI is more than 35 kg m¯² , has neck circumference > 40 cm, and/or high blood pressure the risk is greater (© American Sleep Apnea Association, 2006).

## 2021-07-29 NOTE — PROGRESS NOTES
Preoperative Screening for Elective Surgery/Invasive Procedures While COVID-19 present in the community     Have you had any of the following symptoms? o Fever, chills  o Cough  o Shortness of breath  o Muscle aches/pain  o Diarrhea  o Abdominal pain, nausea, vomiting  o Loss or decrease in taste and / or smell   Risk of Exposure  o Have you recently been hospitalized for COVID-19 or flu-like illness, if so when?  o Recently diagnosed with COVID-19, if so when?  o Recently tested for COVID-19, if so when?  o Have you been in close contact with a person or family member who currently has or recently had COVID-19? If yes, when and in what context?  o Do you live with anybody who in the last 14 days has had fever, chills, shortness of breath, muscle aches, flu-like illness?  o Do you have any close contacts or family members who are currently in the hospital for COVID-19 or flu-like illness? If yes, assess recent close contact with this person. Indicate if the patient has a positive screen by answering yes to one or more of the above questions. Patients who test positive or screen positive prior to surgery or on the day of surgery should be evaluated in conjunction with the surgeon/proceduralist/anesthesiologist to determine the urgency of the procedure.      None  To all of these

## 2021-08-01 PROBLEM — N93.8 DYSFUNCTIONAL UTERINE BLEEDING: Status: ACTIVE | Noted: 2021-08-01

## 2021-08-01 PROBLEM — N92.1 MENORRHAGIA WITH IRREGULAR CYCLE: Status: ACTIVE | Noted: 2021-08-01

## 2021-08-01 ASSESSMENT — ENCOUNTER SYMPTOMS
DIARRHEA: 0
SHORTNESS OF BREATH: 0
ABDOMINAL PAIN: 0
ABDOMINAL DISTENTION: 0
NAUSEA: 0
VOMITING: 0
CONSTIPATION: 0

## 2021-08-02 NOTE — PROGRESS NOTES
Subjective:      Patient ID: Shashi James is a 47 y.o. female. HPI   46 y/o  female presents for preop visit to sign consents. Patient is scheduled for Davinci robotic total laparoscopic hysterectomy, bilateral salpingectomy, possible bilateral oophorectomy secondary to irregular and heavy menses. Patient recently seen for well woman examination on 2021--normal pap (no endocervical cells), negative testing for high risk HPV. Denies history of abnormal pap smear. Menses have been very irregular. Bleeding lasts 6 days. Bleeds every 2 weeks. Bleeding is moderate. Has had issues with irregular and heavy menses in the past.  Patient is 11 months  s/p operative laparoscopy for ruptured ectopic pregnancy, evacuation of hemoperitoneum, bilateral salpingectomy with frozen section of left fallopian tube, lysis of adhesions. Had some difficulty dealing with loss of pregnancy as well as loss of fertility but is now resigned to the situation. Patient is no longer considering future pregnancy and is interested in definitive therapy. Has considered options presented at last visit and declines endometrial ablation due to possibility of failure. History is positive for endometriosis, history of postpartum hemorrhage (2 u PRBCs), history of , and morbid obesity. Smoking < 1 PPD on good days, 1-2 PPD on bad days. Review of Systems   Constitutional: Negative. Negative for chills, fatigue and fever. Respiratory: Negative for shortness of breath. Cardiovascular: Negative for chest pain. Gastrointestinal: Negative for abdominal distention, abdominal pain, constipation, diarrhea, nausea and vomiting. Genitourinary: Positive for menstrual problem and vaginal bleeding. Negative for difficulty urinating, dysuria, hematuria, vaginal discharge and vaginal pain. Psychiatric/Behavioral: Negative. Objective:   Physical Exam  Vitals and nursing note reviewed.    Constitutional: General: She is not in acute distress. Appearance: Normal appearance. She is well-developed. She is not ill-appearing, toxic-appearing or diaphoretic. Pulmonary:      Effort: Pulmonary effort is normal.   Skin:     General: Skin is warm and dry. Neurological:      Mental Status: She is alert and oriented to person, place, and time. Psychiatric:         Behavior: Behavior normal.         Thought Content: Thought content normal.         Judgment: Judgment normal.       PELVIC ULTRASOUND without DOPPLER INTERROGATION    NON OB       DATE: 6/10/2021       PHYSICIAN: ADRIAN COLLINS        SONOGRAPHER: CHULA [facility]       INDICATION: abnormal uterine bleeding       TYPE OF SCAN: vaginal       FINDINGS:     The cul de sac is normal. No free fluid appreciated. The cervix is normal and not enlarged. Nabothian cyst/s is noted within the uterine cervix. The uterus measures 9.16 cm x 4.58 cm x 4.24 cm.     The uterus is anteverted. The endometrium measures 9.70 mm. The myometrium is homogeneous in appearance. No uterine anomalies are noted. The right ovary is present and normal.  The right ovary measures 1.77 cm x 2.02 cm x 1.80 cm.     Ovary findings: No masses seen. The right adnexa is normal.   The left ovary is present.  The left ovary measures 4.21 cm x 3.81 cm x 3.43 cm.     Ovary findings: Simple cyst seen measuring 3.34 cm x 2.74 cm x 3.05 cm. The left adnexa is normal.       IMPRESSION: Normal uterus. Normal right ovary. Left ovarian cyst.      Imaging is limited secondary to bowel gas. The patient is well aware of the limitations of ultrasound in the detection of anomalies of the abdomen and pelvis. Assessment:       Diagnosis Orders   1. Preop testing     2. Dysfunctional uterine bleeding     3. Menorrhagia with irregular cycle     4. Obesity (BMI 30-39.9)     5. Endometriosis     6. Smoker             Plan:       Smoking cessation  Options for treatment reviewed.   Risks and benefits of upcoming surgery discussed. Written and informed consent obtained. Risks and benefits of bowel prep reviewed. Follow up for surgery.            Alice Fajardo DO

## 2021-08-05 ENCOUNTER — ANESTHESIA EVENT (OUTPATIENT)
Dept: OPERATING ROOM | Age: 41
End: 2021-08-05
Payer: COMMERCIAL

## 2021-08-05 ASSESSMENT — LIFESTYLE VARIABLES: SMOKING_STATUS: 1

## 2021-08-05 NOTE — ANESTHESIA PRE PROCEDURE
Department of Anesthesiology  Preprocedure Note       Name:  Jamilah Eduardo   Age:  47 y.o.  :  1980                                          MRN:  [id number]         Date:  2021      Surgeon: Bobby Guidry):  New Fajardo DO    Procedure: Procedure(s):  DAVINCI ROBOTIC TOTAL LAPAROSCOPIC HYSTERECTOMY, POSSIBLE BILATERAL OOPHORECTOMY  CPT CODE - 91298    Medications prior to admission:   Prior to Admission medications    Medication Sig Start Date End Date Taking? Authorizing Provider   busPIRone (BUSPAR) 5 MG tablet  21   Historical Provider, MD   escitalopram (LEXAPRO) 10 MG tablet  21   Historical Provider, MD   acetaminophen (TYLENOL) 500 MG tablet Take 500 mg by mouth every 6 hours as needed for Pain    Historical Provider, MD       Current medications:    No current facility-administered medications for this encounter. Current Outpatient Medications   Medication Sig Dispense Refill    busPIRone (BUSPAR) 5 MG tablet       escitalopram (LEXAPRO) 10 MG tablet       acetaminophen (TYLENOL) 500 MG tablet Take 500 mg by mouth every 6 hours as needed for Pain         Allergies: Allergies   Allergen Reactions    Codeine     Dilaudid [Hydromorphone]        Problem List:    Patient Active Problem List   Diagnosis Code    Adipex exposure O09.891    Maternal exposure to alcohol O09.899    Obesity (BMI 30-39. 9) E66.9    Abdominal wall hematoma S30. 1XXA    Smoker F17.200    Bleeding tendency (HCC) D69.9    Endometriosis N80.9    Dysfunctional uterine bleeding N93.8    Menorrhagia with irregular cycle N92.1       Past Medical History:        Diagnosis Date    Abnormal Pap smear of cervix     Arthritis     BMI 45.0-49.9, adult (HCC)     Endometriosis     Maternal blood transfusion     PP hemmorhage     Morbidly obese ([facility] [address]     Pregnancy related carpal tunnel syndrome, antepartum        Past Surgical History:        Procedure Laterality Date     SECTION  CHOLECYSTECTOMY  2000    LAPAROSCOPY N/A 8/7/2020    OPERATIVE LAPAROSCOPY FOR RUPTURED ECTOPIC PREGNANCY, EVACUATON OF HEMOPERITONEUM,  BILATERAL SALPINGECTOMY, LYSIS OF ADHESIONS performed by Renny Marin DO at [address]  age 17    Endometriosis       Social History:    Social History     Tobacco Use    Smoking status: Current Every Day Smoker     Packs/day: 1.00     Types: Cigarettes    Smokeless tobacco: Never Used   Substance Use Topics    Alcohol use: Yes     Comment: Socially                                 Ready to quit: Not Answered  Counseling given: Not Answered      Vital Signs (Current):   Vitals:    07/29/21 1702   Weight: 245 lb (111.1 kg)   Height: 5' 6\" (1.676 m)                                              BP Readings from Last 3 Encounters:   06/09/21 124/82   05/12/21 124/78   08/21/20 130/82       NPO Status:                                                                                 BMI:   Wt Readings from Last 3 Encounters:   06/09/21 249 lb (112.9 kg)   05/12/21 244 lb (110.7 kg)   08/21/20 236 lb 9.6 oz (107.3 kg)     Body mass index is 39.54 kg/m². CBC:   Lab Results   Component Value Date    WBC 15.4 08/07/2020    RBC 4.25 08/07/2020    HGB 11.3 08/07/2020    HCT 33.8 08/07/2020    MCV 91.8 08/07/2020    RDW 12.6 08/07/2020     08/07/2020       CMP:   Lab Results   Component Value Date     08/07/2020    K 4.0 08/07/2020     08/07/2020    CO2 22 08/07/2020    BUN 13 08/07/2020    CREATININE 1.1 08/07/2020    GFRAA >60 08/07/2020    AGRATIO 1.4 08/07/2020    LABGLOM 56 08/07/2020    GLUCOSE 226 08/07/2020    PROT 7.3 08/07/2020    CALCIUM 9.4 08/07/2020    BILITOT 0.6 08/07/2020    ALKPHOS 48 08/07/2020    AST 11 08/07/2020    ALT 10 08/07/2020       POC Tests: No results for input(s): POCGLU, POCNA, POCK, POCCL, POCBUN, POCHEMO, POCHCT in the last 72 hours.     Coags:   Lab Results   Component Value Date    PROTIME 15.1 08/07/2020    INR 1.30 08/07/2020       HCG (If Applicable):   Lab Results   Component Value Date    PREGTESTUR Negative 10/27/2015        ABGs: No results found for: PHART, PO2ART, DXD2PFB, JRN9YHR, BEART, E0MHPULX     Type & Screen (If Applicable):  Lab Results   Component Value Date    LABABO A 05/09/2013    LABRH Positive 05/09/2013       Drug/Infectious Status (If Applicable):  No results found for: HIV, HEPCAB    COVID-19 Screening (If Applicable):   Lab Results   Component Value Date    COVID19 Not Detected 08/07/2020           Anesthesia Evaluation  Patient summary reviewed and Nursing notes reviewed no history of anesthetic complications:   Airway: Mallampati: II  TM distance: >3 FB   Neck ROM: full  Mouth opening: > = 3 FB Dental:    (+) caps      Pulmonary:   (+) current smoker                           Cardiovascular:Negative CV ROS                      Neuro/Psych:   (+) neuromuscular disease:,             GI/Hepatic/Renal: Neg GI/Hepatic/Renal ROS       (-) GERD, liver disease and no renal disease       Endo/Other: Negative Endo/Other ROS       (-) diabetes mellitus               Abdominal:             Vascular: negative vascular ROS. Other Findings:           Anesthesia Plan      general     ASA 3     (I discussed with the patient the risks and benefits of PIV, general anesthesia, IV Narcotics, PACU. All questions were answered the patient agrees with the plan)  Induction: intravenous. MIPS: Prophylactic antiemetics administered. Anesthetic plan and risks discussed with patient. Plan discussed with CRNA.                   Riley Bowen MD   8/5/2021

## 2021-08-06 ENCOUNTER — ANESTHESIA (OUTPATIENT)
Dept: OPERATING ROOM | Age: 41
End: 2021-08-06
Payer: COMMERCIAL

## 2021-08-06 ENCOUNTER — HOSPITAL ENCOUNTER (OUTPATIENT)
Age: 41
Discharge: HOME | End: 2021-08-07
Attending: OBSTETRICS & GYNECOLOGY | Admitting: OBSTETRICS & GYNECOLOGY
Payer: COMMERCIAL

## 2021-08-06 VITALS
TEMPERATURE: 96.1 F | DIASTOLIC BLOOD PRESSURE: 77 MMHG | RESPIRATION RATE: 40 BRPM | OXYGEN SATURATION: 91 % | SYSTOLIC BLOOD PRESSURE: 125 MMHG

## 2021-08-06 DIAGNOSIS — N80.9 ENDOMETRIOSIS: ICD-10-CM

## 2021-08-06 DIAGNOSIS — N93.9 ABNORMAL UTERINE BLEEDING: ICD-10-CM

## 2021-08-06 DIAGNOSIS — Z98.890 S/P ROBOT-ASSISTED SURGICAL PROCEDURE: Primary | ICD-10-CM

## 2021-08-06 LAB — PREGNANCY, URINE: NEGATIVE

## 2021-08-06 PROCEDURE — 2580000003 HC RX 258: Performed by: OBSTETRICS & GYNECOLOGY

## 2021-08-06 PROCEDURE — 2500000003 HC RX 250 WO HCPCS: Performed by: NURSE ANESTHETIST, CERTIFIED REGISTERED

## 2021-08-06 PROCEDURE — 7100000000 HC PACU RECOVERY - FIRST 15 MIN: Performed by: OBSTETRICS & GYNECOLOGY

## 2021-08-06 PROCEDURE — 58570 TLH UTERUS 250 G OR LESS: CPT | Performed by: OBSTETRICS & GYNECOLOGY

## 2021-08-06 PROCEDURE — 3600000019 HC SURGERY ROBOT ADDTL 15MIN: Performed by: OBSTETRICS & GYNECOLOGY

## 2021-08-06 PROCEDURE — 6370000000 HC RX 637 (ALT 250 FOR IP): Performed by: ANESTHESIOLOGY

## 2021-08-06 PROCEDURE — 3700000001 HC ADD 15 MINUTES (ANESTHESIA): Performed by: OBSTETRICS & GYNECOLOGY

## 2021-08-06 PROCEDURE — S2900 ROBOTIC SURGICAL SYSTEM: HCPCS | Performed by: OBSTETRICS & GYNECOLOGY

## 2021-08-06 PROCEDURE — 94761 N-INVAS EAR/PLS OXIMETRY MLT: CPT

## 2021-08-06 PROCEDURE — 2580000003 HC RX 258: Performed by: ANESTHESIOLOGY

## 2021-08-06 PROCEDURE — 6360000002 HC RX W HCPCS: Performed by: NURSE ANESTHETIST, CERTIFIED REGISTERED

## 2021-08-06 PROCEDURE — 2700000000 HC OXYGEN THERAPY PER DAY

## 2021-08-06 PROCEDURE — 6360000002 HC RX W HCPCS: Performed by: ANESTHESIOLOGY

## 2021-08-06 PROCEDURE — 6360000002 HC RX W HCPCS: Performed by: OBSTETRICS & GYNECOLOGY

## 2021-08-06 PROCEDURE — 7100000001 HC PACU RECOVERY - ADDTL 15 MIN: Performed by: OBSTETRICS & GYNECOLOGY

## 2021-08-06 PROCEDURE — 88304 TISSUE EXAM BY PATHOLOGIST: CPT

## 2021-08-06 PROCEDURE — 6370000000 HC RX 637 (ALT 250 FOR IP): Performed by: OBSTETRICS & GYNECOLOGY

## 2021-08-06 PROCEDURE — 3700000000 HC ANESTHESIA ATTENDED CARE: Performed by: OBSTETRICS & GYNECOLOGY

## 2021-08-06 PROCEDURE — 3600000009 HC SURGERY ROBOT BASE: Performed by: OBSTETRICS & GYNECOLOGY

## 2021-08-06 PROCEDURE — 2709999900 HC NON-CHARGEABLE SUPPLY: Performed by: OBSTETRICS & GYNECOLOGY

## 2021-08-06 PROCEDURE — 84703 CHORIONIC GONADOTROPIN ASSAY: CPT

## 2021-08-06 PROCEDURE — 88307 TISSUE EXAM BY PATHOLOGIST: CPT

## 2021-08-06 RX ORDER — SODIUM CHLORIDE, SODIUM LACTATE, POTASSIUM CHLORIDE, CALCIUM CHLORIDE 600; 310; 30; 20 MG/100ML; MG/100ML; MG/100ML; MG/100ML
INJECTION, SOLUTION INTRAVENOUS CONTINUOUS
Status: DISCONTINUED | OUTPATIENT
Start: 2021-08-06 | End: 2021-08-07 | Stop reason: HOSPADM

## 2021-08-06 RX ORDER — MORPHINE SULFATE 2 MG/ML
2 INJECTION, SOLUTION INTRAMUSCULAR; INTRAVENOUS EVERY 5 MIN PRN
Status: DISCONTINUED | OUTPATIENT
Start: 2021-08-06 | End: 2021-08-06 | Stop reason: HOSPADM

## 2021-08-06 RX ORDER — ONDANSETRON 4 MG/1
4 TABLET, ORALLY DISINTEGRATING ORAL EVERY 8 HOURS PRN
Status: DISCONTINUED | OUTPATIENT
Start: 2021-08-06 | End: 2021-08-07 | Stop reason: HOSPADM

## 2021-08-06 RX ORDER — APREPITANT 40 MG/1
40 CAPSULE ORAL ONCE
Status: COMPLETED | OUTPATIENT
Start: 2021-08-06 | End: 2021-08-06

## 2021-08-06 RX ORDER — FENTANYL CITRATE 50 UG/ML
INJECTION, SOLUTION INTRAMUSCULAR; INTRAVENOUS PRN
Status: DISCONTINUED | OUTPATIENT
Start: 2021-08-06 | End: 2021-08-06 | Stop reason: SDUPTHER

## 2021-08-06 RX ORDER — OXYCODONE HYDROCHLORIDE AND ACETAMINOPHEN 5; 325 MG/1; MG/1
2 TABLET ORAL PRN
Status: COMPLETED | OUTPATIENT
Start: 2021-08-06 | End: 2021-08-06

## 2021-08-06 RX ORDER — SODIUM CHLORIDE 9 MG/ML
25 INJECTION, SOLUTION INTRAVENOUS PRN
Status: DISCONTINUED | OUTPATIENT
Start: 2021-08-06 | End: 2021-08-06 | Stop reason: HOSPADM

## 2021-08-06 RX ORDER — SODIUM CHLORIDE 0.9 % (FLUSH) 0.9 %
5-40 SYRINGE (ML) INJECTION PRN
Status: DISCONTINUED | OUTPATIENT
Start: 2021-08-06 | End: 2021-08-07 | Stop reason: HOSPADM

## 2021-08-06 RX ORDER — ONDANSETRON 2 MG/ML
4 INJECTION INTRAMUSCULAR; INTRAVENOUS EVERY 6 HOURS PRN
Status: DISCONTINUED | OUTPATIENT
Start: 2021-08-06 | End: 2021-08-07 | Stop reason: HOSPADM

## 2021-08-06 RX ORDER — HYDROMORPHONE HCL 110MG/55ML
0.5 PATIENT CONTROLLED ANALGESIA SYRINGE INTRAVENOUS EVERY 4 HOURS PRN
Status: DISCONTINUED | OUTPATIENT
Start: 2021-08-06 | End: 2021-08-06

## 2021-08-06 RX ORDER — LIDOCAINE HYDROCHLORIDE 10 MG/ML
1 INJECTION, SOLUTION EPIDURAL; INFILTRATION; INTRACAUDAL; PERINEURAL
Status: DISCONTINUED | OUTPATIENT
Start: 2021-08-06 | End: 2021-08-06 | Stop reason: HOSPADM

## 2021-08-06 RX ORDER — DIPHENHYDRAMINE HYDROCHLORIDE 50 MG/ML
12.5 INJECTION INTRAMUSCULAR; INTRAVENOUS
Status: DISCONTINUED | OUTPATIENT
Start: 2021-08-06 | End: 2021-08-06 | Stop reason: HOSPADM

## 2021-08-06 RX ORDER — OXYCODONE HYDROCHLORIDE AND ACETAMINOPHEN 5; 325 MG/1; MG/1
1 TABLET ORAL PRN
Status: COMPLETED | OUTPATIENT
Start: 2021-08-06 | End: 2021-08-06

## 2021-08-06 RX ORDER — PROPOFOL 10 MG/ML
INJECTION, EMULSION INTRAVENOUS PRN
Status: DISCONTINUED | OUTPATIENT
Start: 2021-08-06 | End: 2021-08-06 | Stop reason: SDUPTHER

## 2021-08-06 RX ORDER — MIDAZOLAM HYDROCHLORIDE 1 MG/ML
INJECTION INTRAMUSCULAR; INTRAVENOUS PRN
Status: DISCONTINUED | OUTPATIENT
Start: 2021-08-06 | End: 2021-08-06 | Stop reason: SDUPTHER

## 2021-08-06 RX ORDER — SODIUM CHLORIDE 0.9 % (FLUSH) 0.9 %
5-40 SYRINGE (ML) INJECTION PRN
Status: DISCONTINUED | OUTPATIENT
Start: 2021-08-06 | End: 2021-08-06 | Stop reason: HOSPADM

## 2021-08-06 RX ORDER — LABETALOL HYDROCHLORIDE 5 MG/ML
5 INJECTION, SOLUTION INTRAVENOUS EVERY 10 MIN PRN
Status: DISCONTINUED | OUTPATIENT
Start: 2021-08-06 | End: 2021-08-06 | Stop reason: HOSPADM

## 2021-08-06 RX ORDER — MORPHINE SULFATE 4 MG/ML
4 INJECTION, SOLUTION INTRAMUSCULAR; INTRAVENOUS
Status: DISCONTINUED | OUTPATIENT
Start: 2021-08-06 | End: 2021-08-07 | Stop reason: HOSPADM

## 2021-08-06 RX ORDER — ONDANSETRON 2 MG/ML
INJECTION INTRAMUSCULAR; INTRAVENOUS PRN
Status: DISCONTINUED | OUTPATIENT
Start: 2021-08-06 | End: 2021-08-06 | Stop reason: SDUPTHER

## 2021-08-06 RX ORDER — ACETAMINOPHEN 325 MG/1
650 TABLET ORAL EVERY 4 HOURS PRN
Status: DISCONTINUED | OUTPATIENT
Start: 2021-08-06 | End: 2021-08-07 | Stop reason: HOSPADM

## 2021-08-06 RX ORDER — SODIUM CHLORIDE 0.9 % (FLUSH) 0.9 %
5-40 SYRINGE (ML) INJECTION EVERY 12 HOURS SCHEDULED
Status: DISCONTINUED | OUTPATIENT
Start: 2021-08-06 | End: 2021-08-06 | Stop reason: HOSPADM

## 2021-08-06 RX ORDER — GLYCOPYRROLATE 0.2 MG/ML
INJECTION INTRAMUSCULAR; INTRAVENOUS PRN
Status: DISCONTINUED | OUTPATIENT
Start: 2021-08-06 | End: 2021-08-06 | Stop reason: SDUPTHER

## 2021-08-06 RX ORDER — ESCITALOPRAM OXALATE 10 MG/1
10 TABLET ORAL DAILY
Status: DISCONTINUED | OUTPATIENT
Start: 2021-08-07 | End: 2021-08-07 | Stop reason: HOSPADM

## 2021-08-06 RX ORDER — KETOROLAC TROMETHAMINE 30 MG/ML
30 INJECTION, SOLUTION INTRAMUSCULAR; INTRAVENOUS EVERY 6 HOURS
Status: DISCONTINUED | OUTPATIENT
Start: 2021-08-06 | End: 2021-08-07 | Stop reason: HOSPADM

## 2021-08-06 RX ORDER — EPHEDRINE SULFATE 50 MG/ML
INJECTION INTRAVENOUS PRN
Status: DISCONTINUED | OUTPATIENT
Start: 2021-08-06 | End: 2021-08-06 | Stop reason: SDUPTHER

## 2021-08-06 RX ORDER — MORPHINE SULFATE 2 MG/ML
2 INJECTION, SOLUTION INTRAMUSCULAR; INTRAVENOUS
Status: DISCONTINUED | OUTPATIENT
Start: 2021-08-06 | End: 2021-08-07 | Stop reason: HOSPADM

## 2021-08-06 RX ORDER — MORPHINE SULFATE 2 MG/ML
1 INJECTION, SOLUTION INTRAMUSCULAR; INTRAVENOUS EVERY 5 MIN PRN
Status: DISCONTINUED | OUTPATIENT
Start: 2021-08-06 | End: 2021-08-06 | Stop reason: HOSPADM

## 2021-08-06 RX ORDER — KETOROLAC TROMETHAMINE 30 MG/ML
INJECTION, SOLUTION INTRAMUSCULAR; INTRAVENOUS PRN
Status: DISCONTINUED | OUTPATIENT
Start: 2021-08-06 | End: 2021-08-06 | Stop reason: SDUPTHER

## 2021-08-06 RX ORDER — HYDRALAZINE HYDROCHLORIDE 20 MG/ML
5 INJECTION INTRAMUSCULAR; INTRAVENOUS EVERY 10 MIN PRN
Status: DISCONTINUED | OUTPATIENT
Start: 2021-08-06 | End: 2021-08-06 | Stop reason: HOSPADM

## 2021-08-06 RX ORDER — HYDROMORPHONE HCL 110MG/55ML
PATIENT CONTROLLED ANALGESIA SYRINGE INTRAVENOUS PRN
Status: DISCONTINUED | OUTPATIENT
Start: 2021-08-06 | End: 2021-08-06 | Stop reason: SDUPTHER

## 2021-08-06 RX ORDER — SODIUM CHLORIDE 0.9 % (FLUSH) 0.9 %
10 SYRINGE (ML) INJECTION EVERY 12 HOURS SCHEDULED
Status: DISCONTINUED | OUTPATIENT
Start: 2021-08-06 | End: 2021-08-06 | Stop reason: SDUPTHER

## 2021-08-06 RX ORDER — MEPERIDINE HYDROCHLORIDE 50 MG/ML
12.5 INJECTION INTRAMUSCULAR; INTRAVENOUS; SUBCUTANEOUS EVERY 5 MIN PRN
Status: DISCONTINUED | OUTPATIENT
Start: 2021-08-06 | End: 2021-08-06 | Stop reason: HOSPADM

## 2021-08-06 RX ORDER — SODIUM CHLORIDE, SODIUM LACTATE, POTASSIUM CHLORIDE, AND CALCIUM CHLORIDE .6; .31; .03; .02 G/100ML; G/100ML; G/100ML; G/100ML
IRRIGANT IRRIGATION PRN
Status: DISCONTINUED | OUTPATIENT
Start: 2021-08-06 | End: 2021-08-06 | Stop reason: ALTCHOICE

## 2021-08-06 RX ORDER — ONDANSETRON 2 MG/ML
4 INJECTION INTRAMUSCULAR; INTRAVENOUS EVERY 10 MIN PRN
Status: DISCONTINUED | OUTPATIENT
Start: 2021-08-06 | End: 2021-08-06 | Stop reason: HOSPADM

## 2021-08-06 RX ORDER — SODIUM CHLORIDE 0.9 % (FLUSH) 0.9 %
5-40 SYRINGE (ML) INJECTION EVERY 12 HOURS SCHEDULED
Status: DISCONTINUED | OUTPATIENT
Start: 2021-08-06 | End: 2021-08-07 | Stop reason: HOSPADM

## 2021-08-06 RX ORDER — ROCURONIUM BROMIDE 10 MG/ML
INJECTION, SOLUTION INTRAVENOUS PRN
Status: DISCONTINUED | OUTPATIENT
Start: 2021-08-06 | End: 2021-08-06 | Stop reason: SDUPTHER

## 2021-08-06 RX ORDER — LIDOCAINE HYDROCHLORIDE 10 MG/ML
INJECTION, SOLUTION INFILTRATION; PERINEURAL PRN
Status: DISCONTINUED | OUTPATIENT
Start: 2021-08-06 | End: 2021-08-06 | Stop reason: SDUPTHER

## 2021-08-06 RX ORDER — SODIUM CHLORIDE 9 MG/ML
25 INJECTION, SOLUTION INTRAVENOUS PRN
Status: DISCONTINUED | OUTPATIENT
Start: 2021-08-06 | End: 2021-08-07 | Stop reason: HOSPADM

## 2021-08-06 RX ORDER — SODIUM CHLORIDE 0.9 % (FLUSH) 0.9 %
10 SYRINGE (ML) INJECTION PRN
Status: DISCONTINUED | OUTPATIENT
Start: 2021-08-06 | End: 2021-08-06 | Stop reason: SDUPTHER

## 2021-08-06 RX ORDER — DEXAMETHASONE SODIUM PHOSPHATE 10 MG/ML
INJECTION INTRAMUSCULAR; INTRAVENOUS PRN
Status: DISCONTINUED | OUTPATIENT
Start: 2021-08-06 | End: 2021-08-06 | Stop reason: SDUPTHER

## 2021-08-06 RX ADMIN — HYDROMORPHONE HYDROCHLORIDE 0.5 MG: 2 INJECTION INTRAMUSCULAR; INTRAVENOUS; SUBCUTANEOUS at 08:51

## 2021-08-06 RX ADMIN — SODIUM CHLORIDE, POTASSIUM CHLORIDE, SODIUM LACTATE AND CALCIUM CHLORIDE: 600; 310; 30; 20 INJECTION, SOLUTION INTRAVENOUS at 10:33

## 2021-08-06 RX ADMIN — EPHEDRINE SULFATE 10 MG: 50 INJECTION INTRAVENOUS at 08:40

## 2021-08-06 RX ADMIN — GLYCOPYRROLATE 0.3 MG: 0.2 INJECTION, SOLUTION INTRAMUSCULAR; INTRAVENOUS at 08:36

## 2021-08-06 RX ADMIN — ENOXAPARIN SODIUM 40 MG: 40 INJECTION SUBCUTANEOUS at 18:06

## 2021-08-06 RX ADMIN — HYDROMORPHONE HYDROCHLORIDE 0.5 MG: 2 INJECTION INTRAMUSCULAR; INTRAVENOUS; SUBCUTANEOUS at 10:34

## 2021-08-06 RX ADMIN — ONDANSETRON 4 MG: 2 INJECTION INTRAMUSCULAR; INTRAVENOUS at 15:47

## 2021-08-06 RX ADMIN — MORPHINE SULFATE 2 MG: 2 INJECTION, SOLUTION INTRAMUSCULAR; INTRAVENOUS at 19:19

## 2021-08-06 RX ADMIN — ONDANSETRON 4 MG: 2 INJECTION INTRAMUSCULAR; INTRAVENOUS at 14:00

## 2021-08-06 RX ADMIN — LIDOCAINE HYDROCHLORIDE 40 MG: 10 INJECTION, SOLUTION INFILTRATION; PERINEURAL at 07:40

## 2021-08-06 RX ADMIN — SODIUM CHLORIDE, POTASSIUM CHLORIDE, SODIUM LACTATE AND CALCIUM CHLORIDE: 600; 310; 30; 20 INJECTION, SOLUTION INTRAVENOUS at 07:18

## 2021-08-06 RX ADMIN — MORPHINE SULFATE 2 MG: 2 INJECTION, SOLUTION INTRAMUSCULAR; INTRAVENOUS at 11:42

## 2021-08-06 RX ADMIN — SODIUM CHLORIDE, POTASSIUM CHLORIDE, SODIUM LACTATE AND CALCIUM CHLORIDE: 600; 310; 30; 20 INJECTION, SOLUTION INTRAVENOUS at 15:50

## 2021-08-06 RX ADMIN — KETOROLAC TROMETHAMINE 30 MG: 30 INJECTION, SOLUTION INTRAMUSCULAR; INTRAVENOUS at 10:34

## 2021-08-06 RX ADMIN — Medication 10 ML: at 15:47

## 2021-08-06 RX ADMIN — FENTANYL CITRATE 100 MCG: 50 INJECTION INTRAMUSCULAR; INTRAVENOUS at 07:40

## 2021-08-06 RX ADMIN — DEXAMETHASONE SODIUM PHOSPHATE 8 MG: 10 INJECTION INTRAMUSCULAR; INTRAVENOUS at 08:18

## 2021-08-06 RX ADMIN — APREPITANT 40 MG: 40 CAPSULE ORAL at 07:27

## 2021-08-06 RX ADMIN — ROCURONIUM BROMIDE 50 MG: 10 SOLUTION INTRAVENOUS at 07:40

## 2021-08-06 RX ADMIN — MIDAZOLAM HYDROCHLORIDE 2 MG: 2 INJECTION, SOLUTION INTRAMUSCULAR; INTRAVENOUS at 07:26

## 2021-08-06 RX ADMIN — MORPHINE SULFATE 2 MG: 2 INJECTION, SOLUTION INTRAMUSCULAR; INTRAVENOUS at 12:13

## 2021-08-06 RX ADMIN — ROCURONIUM BROMIDE 30 MG: 10 SOLUTION INTRAVENOUS at 08:34

## 2021-08-06 RX ADMIN — HYDROMORPHONE HYDROCHLORIDE 0.5 MG: 2 INJECTION INTRAMUSCULAR; INTRAVENOUS; SUBCUTANEOUS at 08:34

## 2021-08-06 RX ADMIN — SUGAMMADEX 200 MG: 100 INJECTION, SOLUTION INTRAVENOUS at 10:33

## 2021-08-06 RX ADMIN — SODIUM CHLORIDE, POTASSIUM CHLORIDE, SODIUM LACTATE AND CALCIUM CHLORIDE: 600; 310; 30; 20 INJECTION, SOLUTION INTRAVENOUS at 23:52

## 2021-08-06 RX ADMIN — OXYCODONE HYDROCHLORIDE AND ACETAMINOPHEN 2 TABLET: 5; 325 TABLET ORAL at 11:57

## 2021-08-06 RX ADMIN — CEFAZOLIN 2000 MG: 10 INJECTION, POWDER, FOR SOLUTION INTRAVENOUS at 07:47

## 2021-08-06 RX ADMIN — HYDROMORPHONE HYDROCHLORIDE 0.5 MG: 2 INJECTION INTRAMUSCULAR; INTRAVENOUS; SUBCUTANEOUS at 10:33

## 2021-08-06 RX ADMIN — ONDANSETRON 4 MG: 2 INJECTION INTRAMUSCULAR; INTRAVENOUS at 08:18

## 2021-08-06 RX ADMIN — ACETAMINOPHEN 650 MG: 325 TABLET ORAL at 21:56

## 2021-08-06 RX ADMIN — MORPHINE SULFATE 2 MG: 2 INJECTION, SOLUTION INTRAMUSCULAR; INTRAVENOUS at 11:16

## 2021-08-06 RX ADMIN — PROPOFOL 200 MG: 10 INJECTION, EMULSION INTRAVENOUS at 07:40

## 2021-08-06 RX ADMIN — Medication 10 ML: at 21:56

## 2021-08-06 ASSESSMENT — PULMONARY FUNCTION TESTS
PIF_VALUE: 36
PIF_VALUE: 23
PIF_VALUE: 21
PIF_VALUE: 23
PIF_VALUE: 21
PIF_VALUE: 22
PIF_VALUE: 35
PIF_VALUE: 1
PIF_VALUE: 21
PIF_VALUE: 25
PIF_VALUE: 1
PIF_VALUE: 35
PIF_VALUE: 36
PIF_VALUE: 35
PIF_VALUE: 35
PIF_VALUE: 22
PIF_VALUE: 36
PIF_VALUE: 35
PIF_VALUE: 32
PIF_VALUE: 22
PIF_VALUE: 35
PIF_VALUE: 37
PIF_VALUE: 22
PIF_VALUE: 25
PIF_VALUE: 38
PIF_VALUE: 35
PIF_VALUE: 35
PIF_VALUE: 21
PIF_VALUE: 26
PIF_VALUE: 27
PIF_VALUE: 25
PIF_VALUE: 16
PIF_VALUE: 22
PIF_VALUE: 35
PIF_VALUE: 38
PIF_VALUE: 35
PIF_VALUE: 35
PIF_VALUE: 21
PIF_VALUE: 38
PIF_VALUE: 35
PIF_VALUE: 36
PIF_VALUE: 22
PIF_VALUE: 25
PIF_VALUE: 18
PIF_VALUE: 35
PIF_VALUE: 29
PIF_VALUE: 21
PIF_VALUE: 36
PIF_VALUE: 34
PIF_VALUE: 37
PIF_VALUE: 36
PIF_VALUE: 3
PIF_VALUE: 37
PIF_VALUE: 36
PIF_VALUE: 35
PIF_VALUE: 22
PIF_VALUE: 36
PIF_VALUE: 21
PIF_VALUE: 35
PIF_VALUE: 22
PIF_VALUE: 21
PIF_VALUE: 34
PIF_VALUE: 36
PIF_VALUE: 37
PIF_VALUE: 36
PIF_VALUE: 21
PIF_VALUE: 39
PIF_VALUE: 35
PIF_VALUE: 22
PIF_VALUE: 1
PIF_VALUE: 36
PIF_VALUE: 39
PIF_VALUE: 39
PIF_VALUE: 36
PIF_VALUE: 21
PIF_VALUE: 1
PIF_VALUE: 22
PIF_VALUE: 35
PIF_VALUE: 36
PIF_VALUE: 1
PIF_VALUE: 35
PIF_VALUE: 36
PIF_VALUE: 36
PIF_VALUE: 24
PIF_VALUE: 36
PIF_VALUE: 22
PIF_VALUE: 23
PIF_VALUE: 35
PIF_VALUE: 38
PIF_VALUE: 36
PIF_VALUE: 23
PIF_VALUE: 35
PIF_VALUE: 22
PIF_VALUE: 24
PIF_VALUE: 2
PIF_VALUE: 35
PIF_VALUE: 21
PIF_VALUE: 22
PIF_VALUE: 21
PIF_VALUE: 36
PIF_VALUE: 21
PIF_VALUE: 37
PIF_VALUE: 36
PIF_VALUE: 35
PIF_VALUE: 35
PIF_VALUE: 22
PIF_VALUE: 22
PIF_VALUE: 36
PIF_VALUE: 22
PIF_VALUE: 36
PIF_VALUE: 21
PIF_VALUE: 17
PIF_VALUE: 25
PIF_VALUE: 25
PIF_VALUE: 39
PIF_VALUE: 21
PIF_VALUE: 21
PIF_VALUE: 23
PIF_VALUE: 37
PIF_VALUE: 22
PIF_VALUE: 35
PIF_VALUE: 27
PIF_VALUE: 36
PIF_VALUE: 22
PIF_VALUE: 36
PIF_VALUE: 35
PIF_VALUE: 22
PIF_VALUE: 28
PIF_VALUE: 22
PIF_VALUE: 22
PIF_VALUE: 29
PIF_VALUE: 25
PIF_VALUE: 29
PIF_VALUE: 35
PIF_VALUE: 22
PIF_VALUE: 21
PIF_VALUE: 34
PIF_VALUE: 22
PIF_VALUE: 35
PIF_VALUE: 21
PIF_VALUE: 21
PIF_VALUE: 38
PIF_VALUE: 35
PIF_VALUE: 36
PIF_VALUE: 36
PIF_VALUE: 21
PIF_VALUE: 36
PIF_VALUE: 1
PIF_VALUE: 38
PIF_VALUE: 17
PIF_VALUE: 37
PIF_VALUE: 22
PIF_VALUE: 27
PIF_VALUE: 11
PIF_VALUE: 36
PIF_VALUE: 28
PIF_VALUE: 28
PIF_VALUE: 36
PIF_VALUE: 22
PIF_VALUE: 36
PIF_VALUE: 22
PIF_VALUE: 1
PIF_VALUE: 22
PIF_VALUE: 1
PIF_VALUE: 22
PIF_VALUE: 28
PIF_VALUE: 36
PIF_VALUE: 21
PIF_VALUE: 36
PIF_VALUE: 36
PIF_VALUE: 17
PIF_VALUE: 0
PIF_VALUE: 5
PIF_VALUE: 36
PIF_VALUE: 23
PIF_VALUE: 21
PIF_VALUE: 21
PIF_VALUE: 36
PIF_VALUE: 25
PIF_VALUE: 23
PIF_VALUE: 36
PIF_VALUE: 36
PIF_VALUE: 35
PIF_VALUE: 36
PIF_VALUE: 36

## 2021-08-06 ASSESSMENT — PAIN SCALES - GENERAL
PAINLEVEL_OUTOF10: 9
PAINLEVEL_OUTOF10: 4
PAINLEVEL_OUTOF10: 2
PAINLEVEL_OUTOF10: 8
PAINLEVEL_OUTOF10: 7
PAINLEVEL_OUTOF10: 2
PAINLEVEL_OUTOF10: 6
PAINLEVEL_OUTOF10: 2
PAINLEVEL_OUTOF10: 6
PAINLEVEL_OUTOF10: 7
PAINLEVEL_OUTOF10: 7

## 2021-08-06 ASSESSMENT — PAIN DESCRIPTION - DESCRIPTORS: DESCRIPTORS: CRAMPING;CONSTANT

## 2021-08-06 ASSESSMENT — PAIN DESCRIPTION - DIRECTION: RADIATING_TOWARDS: NOT RADIATING

## 2021-08-06 ASSESSMENT — PAIN DESCRIPTION - PAIN TYPE
TYPE: CHRONIC PAIN
TYPE: SURGICAL PAIN

## 2021-08-06 ASSESSMENT — PAIN - FUNCTIONAL ASSESSMENT: PAIN_FUNCTIONAL_ASSESSMENT: PREVENTS OR INTERFERES SOME ACTIVE ACTIVITIES AND ADLS

## 2021-08-06 ASSESSMENT — PAIN DESCRIPTION - LOCATION
LOCATION: ABDOMEN
LOCATION: HIP

## 2021-08-06 ASSESSMENT — PAIN DESCRIPTION - ORIENTATION: ORIENTATION: MID;LOWER

## 2021-08-06 ASSESSMENT — PAIN DESCRIPTION - PROGRESSION: CLINICAL_PROGRESSION: GRADUALLY WORSENING

## 2021-08-06 ASSESSMENT — PAIN DESCRIPTION - FREQUENCY: FREQUENCY: CONTINUOUS

## 2021-08-06 ASSESSMENT — PAIN DESCRIPTION - ONSET: ONSET: GRADUAL

## 2021-08-06 NOTE — PROGRESS NOTES
D: Pt brought to PACU. Report obtained from [address] and CRNA. Pt placed on monitor and O2, VSS, drowsy but arousable, arden pad in place, no drainage noted, scant bloody drainage on abdominal incisions.

## 2021-08-06 NOTE — OP NOTE
Operative Note      Patient: Digna Reyes  YOB: 1980  MRN: [id number]    Date of Procedure: 8/6/2021    Pre-Op Diagnosis: ABNORMAL UTERINE BLEEDING; ENDOMETRIOSIS    Post-Op Diagnosis: Same with pelvic adhesions    Procedure:   Naomi Valderrama robotic total laparoscopic hysterectomy, lysis of adhesions, fulguration of endometriosis implants, diagnostic cystoscopy  Procedure(s):  DAVINCI ROBOTIC TOTAL LAPAROSCOPIC HYSTERECTOMY,  FULGERATION OF ENDOMETRIAL IMPLANTS, DIAGNOSTIC CYSTOSCOPY  CPT CODE - 23895    Surgeon(s):  Brie Alejandre DO    Assistant:   Surgical Assistant: Joanna Myrick RN; Davidosnquettkimo April    Anesthesia: General ET    Estimated Blood Loss (mL): 100 cc  IVF: 1000 cc crystalloid  UO: 250 cc clear yellow urine    Complications: None    Specimens:   ID Type Source Tests Collected by Time Destination   A : LEFT CUL-DE-SAC CYST  Tissue Perineum SURGICAL PATHOLOGY Denis Fajardo DO 8/6/2021 0859    B : UTERUS AND CERVIX   (76.9 grams) Tissue Tissue SURGICAL PATHOLOGY Denis Fajardo DO 8/6/2021 5767        Implants:  * No implants in log *      Drains: * No LDAs found *    Findings: endometriosis, pelvic scarring, frozen anterior cul de sac    Disposition: PACU    Condition: stable    Detailed Description of Procedure:   See dictation  [id number]  Electronically signed by Brie Alejandre DO on 8/6/2021 at 10:19 AM

## 2021-08-06 NOTE — ANESTHESIA POSTPROCEDURE EVALUATION
Department of Anesthesiology  Postprocedure Note    Patient: Martina Tovar  MRN: [id number]  YOB: 1980  Date of evaluation: 8/6/2021  Time:  3:52 PM     Procedure Summary     Date: 08/06/21 Room / Location: [address] [place] [address]    Anesthesia Start: 5675 Anesthesia Stop: 2512    Procedure: DAVINCI ROBOTIC TOTAL LAPAROSCOPIC HYSTERECTOMY, LYSIS OF ADHESIONS,  FULGERATION OF ENDOMETRIAL IMPLANTS, DIAGNOSTIC CYSTOSCOPY  CPT CODE - 11182 (N/A ) Diagnosis:       Abnormal uterine bleeding      Endometriosis      (ABNORMAL UTERINE BLEEDING; ENDOMETRIOSIS)    Surgeons: Kanika Rios DO Responsible Provider: Monik Murcia MD    Anesthesia Type: general ASA Status: 3          Anesthesia Type: general    Serene Phase I: Serene Score: 9    Serene Phase II:      Last vitals: Reviewed and per EMR flowsheets.        Anesthesia Post Evaluation    Patient location during evaluation: PACU  Level of consciousness: awake  Airway patency: patent  Nausea & Vomiting: no nausea  Complications: no  Cardiovascular status: blood pressure returned to baseline  Respiratory status: acceptable  Hydration status: euvolemic

## 2021-08-06 NOTE — PROGRESS NOTES
Pt transferred to C349 from PACU in stable condition. Pt a/o x4, VSS, on 1L/min via NC of supplemental O2, not in distress. Reports nausea, intensifies with movement and turning. Pain rated at 6 out 10 at mid to upper abdomen. Denies any needs. Will continue to monitor.

## 2021-08-07 VITALS
RESPIRATION RATE: 16 BRPM | BODY MASS INDEX: 42.81 KG/M2 | HEIGHT: 66 IN | TEMPERATURE: 97.8 F | SYSTOLIC BLOOD PRESSURE: 118 MMHG | DIASTOLIC BLOOD PRESSURE: 78 MMHG | OXYGEN SATURATION: 96 % | HEART RATE: 91 BPM | WEIGHT: 266.4 LBS

## 2021-08-07 LAB
ANION GAP SERPL CALCULATED.3IONS-SCNC: 11 MMOL/L (ref 3–16)
BASOPHILS ABSOLUTE: 0 K/UL (ref 0–0.2)
BASOPHILS RELATIVE PERCENT: 0.3 %
BUN BLDV-MCNC: 7 MG/DL (ref 7–20)
CALCIUM SERPL-MCNC: 8.8 MG/DL (ref 8.3–10.6)
CHLORIDE BLD-SCNC: 104 MMOL/L (ref 99–110)
CO2: 23 MMOL/L (ref 21–32)
CREAT SERPL-MCNC: <0.5 MG/DL (ref 0.6–1.1)
EOSINOPHILS ABSOLUTE: 0 K/UL (ref 0–0.6)
EOSINOPHILS RELATIVE PERCENT: 0.2 %
GFR AFRICAN AMERICAN: >60
GFR NON-AFRICAN AMERICAN: >60
GLUCOSE BLD-MCNC: 138 MG/DL (ref 70–99)
HCT VFR BLD CALC: 38.3 % (ref 36–48)
HEMOGLOBIN: 12.8 G/DL (ref 12–16)
LYMPHOCYTES ABSOLUTE: 2.5 K/UL (ref 1–5.1)
LYMPHOCYTES RELATIVE PERCENT: 13.8 %
MCH RBC QN AUTO: 30.5 PG (ref 26–34)
MCHC RBC AUTO-ENTMCNC: 33.6 G/DL (ref 31–36)
MCV RBC AUTO: 90.8 FL (ref 80–100)
MONOCYTES ABSOLUTE: 1 K/UL (ref 0–1.3)
MONOCYTES RELATIVE PERCENT: 5.3 %
NEUTROPHILS ABSOLUTE: 14.4 K/UL (ref 1.7–7.7)
NEUTROPHILS RELATIVE PERCENT: 80.4 %
PDW BLD-RTO: 13.5 % (ref 12.4–15.4)
PLATELET # BLD: 310 K/UL (ref 135–450)
PMV BLD AUTO: 7.7 FL (ref 5–10.5)
POTASSIUM SERPL-SCNC: 4 MMOL/L (ref 3.5–5.1)
RBC # BLD: 4.22 M/UL (ref 4–5.2)
SODIUM BLD-SCNC: 138 MMOL/L (ref 136–145)
WBC # BLD: 18 K/UL (ref 4–11)

## 2021-08-07 PROCEDURE — 2580000003 HC RX 258: Performed by: OBSTETRICS & GYNECOLOGY

## 2021-08-07 PROCEDURE — 80048 BASIC METABOLIC PNL TOTAL CA: CPT

## 2021-08-07 PROCEDURE — 36415 COLL VENOUS BLD VENIPUNCTURE: CPT

## 2021-08-07 PROCEDURE — 85025 COMPLETE CBC W/AUTO DIFF WBC: CPT

## 2021-08-07 PROCEDURE — 6370000000 HC RX 637 (ALT 250 FOR IP): Performed by: OBSTETRICS & GYNECOLOGY

## 2021-08-07 PROCEDURE — 6360000002 HC RX W HCPCS: Performed by: OBSTETRICS & GYNECOLOGY

## 2021-08-07 PROCEDURE — 99024 POSTOP FOLLOW-UP VISIT: CPT | Performed by: OBSTETRICS & GYNECOLOGY

## 2021-08-07 RX ORDER — OXYCODONE HYDROCHLORIDE AND ACETAMINOPHEN 5; 325 MG/1; MG/1
2 TABLET ORAL EVERY 6 HOURS PRN
Status: DISCONTINUED | OUTPATIENT
Start: 2021-08-07 | End: 2021-08-07 | Stop reason: HOSPADM

## 2021-08-07 RX ORDER — DOCUSATE SODIUM 100 MG/1
100 CAPSULE, LIQUID FILLED ORAL 2 TIMES DAILY PRN
Qty: 30 CAPSULE | Refills: 0 | Status: SHIPPED | OUTPATIENT
Start: 2021-08-07

## 2021-08-07 RX ORDER — OXYCODONE HYDROCHLORIDE AND ACETAMINOPHEN 5; 325 MG/1; MG/1
2 TABLET ORAL EVERY 6 HOURS PRN
Qty: 28 TABLET | Refills: 0 | Status: SHIPPED | OUTPATIENT
Start: 2021-08-07 | End: 2021-08-11

## 2021-08-07 RX ORDER — ONDANSETRON 4 MG/1
4 TABLET, ORALLY DISINTEGRATING ORAL EVERY 8 HOURS PRN
Qty: 10 TABLET | Refills: 0 | Status: SHIPPED | OUTPATIENT
Start: 2021-08-07 | End: 2021-08-20

## 2021-08-07 RX ADMIN — OXYCODONE HYDROCHLORIDE AND ACETAMINOPHEN 2 TABLET: 5; 325 TABLET ORAL at 10:38

## 2021-08-07 RX ADMIN — MORPHINE SULFATE 2 MG: 2 INJECTION, SOLUTION INTRAMUSCULAR; INTRAVENOUS at 02:08

## 2021-08-07 RX ADMIN — ESCITALOPRAM OXALATE 10 MG: 10 TABLET ORAL at 10:38

## 2021-08-07 RX ADMIN — Medication 10 ML: at 10:39

## 2021-08-07 ASSESSMENT — PAIN SCALES - GENERAL
PAINLEVEL_OUTOF10: 8
PAINLEVEL_OUTOF10: 5
PAINLEVEL_OUTOF10: 6

## 2021-08-07 NOTE — PROGRESS NOTES
Pt A&O, VSS. Talkative and pleasant. PRN Percocet given for surgical pain. Denies N/V; BS active x4; passing flatus. Lap incisions with gauze and tegaderm; scant drainage. Denies dyspnea. Ambulating frequently in halls without difficulty. Family at bedside. Pt utilizing incentive spirometry. Call light within reach. Bed side table within reach. Wheels locked. Bed in lowest position. Independent with ADLs. Pt instructed to call out for assistance. Pt expressed understanding & calls out appropriately. Shift assessment complete. All care cont per orders.     Electronically signed by Jennifer Mustafa RN on 8/7/2021 at 11:16 AM

## 2021-08-07 NOTE — PROGRESS NOTES
Department of Gynecology  Attending Progress Note          SUBJECTIVE:  48 y/o  female S/P uncomplicated Davinci robotic total laparoscopic hysterectomy, lysis of adhesions, fulguration of endometriosis, diagnostic cystoscopy. No current complaints are noted including headache, change in vision, fever, chills, chest pain, shortness of breath, nausea, vomiting, diarrhea or constipation. The patient denies any calf tenderness. Ambulating. Tolerating regular diet. Passing flatus. Has noted some slight hematuria--seen postop as well--felt to be secondary to catheter manipulation and irritation from cystoscopy. OBJECTIVE:           Physical Exam  VITALS:  /78   Pulse 93   Temp 98 °F (36.7 °C) (Oral)   Resp 16   Ht 5' 6\" (1.676 m)   Wt 266 lb 6.4 oz (120.8 kg)   LMP 2021   SpO2 97%   BMI 43.00 kg/m²   TEMPERATURE:  Current - Temp: 98 °F (36.7 °C); Max - Temp  Av °F (35.6 °C)  Min: 95.7 °F (35.4 °C)  Max: 98.1 °F (36.7 °C)  CONSTITUTIONAL:  awake, alert, cooperative, no apparent distress, and appears stated age  LUNGS:  No increased work of breathing, good air exchange, clear to auscultation bilaterally, no crackles or wheezing  CARDIOVASCULAR:  normal S1 and S2  ABDOMEN:  soft, non-distended and non-tender, dressings clean dry intact.    MUSCULOSKELETAL:  full range of motion noted  NEUROLOGIC:  Mental Status Exam:  Level of Alertness:   awake  Orientation:   person, place, time  Memory:   normal  Fund of Knowledge:  normal  Attention/Concentration:  normal  Language:  normal  SKIN:  normal skin color, texture, turgor    DATA:  CBC:   Lab Results   Component Value Date    WBC 18.0 2021    RBC 4.22 2021    HGB 12.8 2021    HCT 38.3 2021    MCV 90.8 2021    MCH 30.5 2021    MCHC 33.6 2021    RDW 13.5 2021     2021    MPV 7.7 2021     BMP:    Lab Results   Component Value Date     2021    K 4.0 2021  08/07/2021    CO2 23 08/07/2021    BUN 7 08/07/2021    LABALBU 4.3 08/07/2020    CREATININE <0.5 08/07/2021    CALCIUM 8.8 08/07/2021    GFRAA >60 08/07/2021    LABGLOM >60 08/07/2021    GLUCOSE 138 08/07/2021       ASSESSMENT AND PLAN:  Impression:  S/P Davinci robotic total laparoscopic hysterectomy, lysis of adhesions, fulguration of endometriosis, diagnostic cystoscopy POD #1  Obesity  Smoker    Plan:   See orders and Patient Instructions  Advised to stop smoking  Home today

## 2021-08-07 NOTE — DISCHARGE INSTR - DIET

## 2021-08-07 NOTE — DISCHARGE SUMMARY
DISCHARGE SUMMARY    Primary Diagnosis: dysfunctional uterine bleeding, menorrhagia, endometriosis  Secondary Diagnosis: obesity, smoker  Procedures: Orrin Angelucci robotic total laparoscopic hysterectomy, lysis of adhesions, fulguration of endometriosis, diagnostic cystoscopy  Referrals: none  Significant Findings: pelvic adhesions, endometriosis, uterus with features of adenomyosis  Reason for Hospitalization: surgery  Complications: none        Discharge Instructions:    Diet:common adult  Activity: activity as tolerated, no heavy lifting or driving for 2 weeks, pelvic rest x 6-8 weeks   Medications: Percocet, colace, zofran  Disposition: Home  Condition on discharge: Stable  Follow up: in 2 week(s)

## 2021-08-10 NOTE — OP NOTE
[address], [address]                                OPERATIVE REPORT    PATIENT NAME: Essie Thorne                  :        1980  MED REC NO:   [id number]                          ROOM:       [id number]  ACCOUNT NO:   [de-identified]                           ADMIT DATE: 2021  PROVIDER:     Tylor Terrazas DO    DATE OF PROCEDURE:  2021    PREOPERATIVE DIAGNOSES:  Abnormal uterine bleeding, endometriosis. POSTOPERATIVE DIAGNOSES:  Abnormal uterine bleeding, endometriosis,  pelvic adhesions, and obliterated anterior cul-de-sac. OPERATION PERFORMED:  Sukhdev Bors robotic total laparoscopic hysterectomy,  lysis of adhesions, fulguration of endometriosis. IMPLANTS:  Diagnostic cystoscopy. SURGEON:  Tylor Terrazas DO    ANESTHESIA:  General endotracheal anesthetic. ESTIMATED BLOOD LOSS:  100 mL. IV FLUIDS:  1000 mL of crystalloid. URINE OUTPUT:  250 mL of clear yellow urine. COMPLICATIONS:  None. SPECIMENS:  Left cul-de-sac cyst tissue, uterus, and cervix. FINDINGS:  Endometriosis, pelvic scarring, frozen and obliterated  anterior cul-de-sac. DISPOSITION:  PACU. CONDITION:  Stable. INDICATIONS:  The patient is a 61-year-old  3, para 2-0-1-2  female, who presents for definitive therapy in the forms of hysterectomy  secondary to irregular and heavy menses. Menses have been very irregular with bleeding lasting six days. She  will bleed every two weeks. Bleeding is moderate. She has had chronic issues  with irregular and heavy bleeding. She is 11 months status  post operative laparoscopy for ruptured ectopic pregnancy, evacuation  of hemoperitoneum, bilateral salpingectomy, frozen section of left  fallopian tube, and lysis of adhesions.   After surgical intervention, she  had opted to not proceed with any further pregnancies in the future and  was interested in definitive therapy of treatment for menstrual issues. She had declined endometrial ablation due to the potential failure. In  addition, she has also had a history positive for endometriosis and  postpartum hemorrhage. She smokes tobacco, therefore other options  for treatment are limited. The risks and benefits of the procedure  were discussed and written consent obtained. OPERATIVE PROCEDURE:  The patient was given preoperative antibiotics. Bilateral lower extremity SCDs were placed. She was taken to the OR and  placed under general endotracheal anesthetic. She was then positioned  in the dorsal lithotomy position. She was then prepped and draped in  the usual fashion. Upon prepping and draping the patient, a time-out  was performed. Upon performing a time-out, a Servin catheter was placed. After placement of a Servin catheter, a speculum was then placed in the  patient's vagina. The anterior lip of the cervix was grasped using a  single-tooth tenaculum. Sutures were placed at the 3 o'clock and 9  o'clock positions of the cervix. A FLORENTINO manipulator was then applied to  the cervix and the uterus using a 6 cm tip and a 2.5 cup. After  placement of the FLORENTINO manipulator, attention was turned to the patient's  abdomen where a supraumbilical skin incision was made. A 5-mm blunt  trocar was inserted into the patient's abdomen under direct  visualization and a pneumoperitoneum was introduced. Two further  ports were placed in the left upper quadrant and right upper quadrant  using an 8-mm robotic sleeve. After placement of the initial trocars,  evaluation of the pelvis revealed scarring of the anterior aspect of  the uterus/anterior fundus to the anterior  abdominal/pelvic wall, obliterating the anterior cul de sac. Both ovaries appeared completely normal with no  masses, lesions or other abnormalities. Fallopian tubes were absent  from previous intervention.   Some endometriosis and implants were noted  primarily within the right ovarian fossa and at the right uterosacral  ligament. After evaluation of the pelvis, a fourth assist port was  placed in the right lower quadrant using a 12-mm disposable trocar. The  supraumbilical trocar was replaced with an 8-mm robotic sleeve and the  patient was placed in further steep Trendelenburg and the robot was  docked. After docking the robot, attention was turned to the patient's  pelvis, some scarring within the left ovarian fossa revealed a cystic  area that was comprised of peritoneal and tubal appearing tissue, this was  removed. In addition, several implants of endometriosis were fulgurated  with bipolar cautery. After evaluating the pelvis and fulgurating endometriosis implants, the broad ligament was identified, cauterized and transected to the level of the round ligaments bilaterally. The round ligaments were cauterized and transected. The anterior leaf of the broad  ligament was then taken down to the level of the bladder bilaterally. Extensive  dissection was required to expose the vesico-uterine peritoneal reflection . Dense band-like adhesions were dissected off of the anterior aspect of the uterus to free the uterus from the anterior abdominal wall. Eventually,  bladder reflection in anatomical plane was identified and the bladder  was taken down sharply. After taking down the bladder, the remainder of  the broad ligament was then taken down and cauterized and cut to the  level of the uterine arteries. The uterine arteries were then  cauterized and cut bilaterally. At that point, a hysterotomy was  performed, the uterus was delivered from the pelvis and after delivery  of the uterus, hemostasis was noted. The vaginal cuff was then closed  in a running fashion using 0 PDS. Two further figure-of-eight sutures  were placed using 0 Vicryl at the lateral two thirds of the vaginal  cuff.   After placement of the cuff suture, the pelvis was copiously  irrigated and hemostasis was assured. The right lower quadrant port was  closed at the fascia with single interrupted suture and a closure  device. The remaining ports were then removed after reduction of the  pneumoperitoneum and the skin incisions were closed in a subcuticular  fashion. Steri-Strips and sterile bandage were applied. Attention was then turned to the patient's pelvis. Due to the extensive scarring and obliterated cul-de-sac, a brief cystoscopy  was performed to evaluate the bladder   The bladder was found to be free of any masses, lesions, trauma/injury, or abnormality. The ureters were found to efflux clear urine jets  bilaterally. The cystoscope was  removed. A Servin catheter was then placed. Excellent hemostasis was noted within the vagina. All instruments were  accounted for. Sponge, lap, and needle counts were correct x2. The  patient tolerated the procedure well and was taken to the PACU in stable  condition.         Cortez Jolly DO    D: 08/09/2021 21:23:11       T: 08/10/2021 3:55:30     [email or web address]  Job#: [id number]     Doc#: [id number]    CC:

## 2021-08-20 ENCOUNTER — OFFICE VISIT (OUTPATIENT)
Dept: OBGYN CLINIC | Age: 41
End: 2021-08-20

## 2021-08-20 VITALS
SYSTOLIC BLOOD PRESSURE: 110 MMHG | WEIGHT: 259 LBS | HEIGHT: 66 IN | DIASTOLIC BLOOD PRESSURE: 70 MMHG | HEART RATE: 86 BPM | BODY MASS INDEX: 41.62 KG/M2 | TEMPERATURE: 97.4 F

## 2021-08-20 DIAGNOSIS — Z09 POSTOP CHECK: Primary | ICD-10-CM

## 2021-08-20 DIAGNOSIS — E66.01 MORBID OBESITY WITH BMI OF 40.0-44.9, ADULT (HCC): ICD-10-CM

## 2021-08-20 DIAGNOSIS — N80.9 ENDOMETRIOSIS: ICD-10-CM

## 2021-08-20 DIAGNOSIS — F17.200 SMOKER: ICD-10-CM

## 2021-08-20 DIAGNOSIS — Z98.890 S/P ROBOT-ASSISTED SURGICAL PROCEDURE: ICD-10-CM

## 2021-08-20 PROCEDURE — 99024 POSTOP FOLLOW-UP VISIT: CPT | Performed by: OBSTETRICS & GYNECOLOGY

## 2021-08-25 ENCOUNTER — TELEPHONE (OUTPATIENT)
Dept: OBGYN CLINIC | Age: 41
End: 2021-08-25

## 2021-08-25 NOTE — TELEPHONE ENCOUNTER
Patient called and stated she needs a letter for her to return to work, stated she is due to start sept 7th and needs to be faxed to the following numbers. She needs clearance to work full duty and if there are any activity restrictions.      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

## 2021-09-12 PROBLEM — N92.1 MENORRHAGIA WITH IRREGULAR CYCLE: Status: RESOLVED | Noted: 2021-08-01 | Resolved: 2021-09-12

## 2021-09-12 PROBLEM — N93.8 DYSFUNCTIONAL UTERINE BLEEDING: Status: RESOLVED | Noted: 2021-08-01 | Resolved: 2021-09-12

## 2021-09-12 ASSESSMENT — ENCOUNTER SYMPTOMS
VOMITING: 0
ABDOMINAL PAIN: 0
ABDOMINAL DISTENTION: 0
SHORTNESS OF BREATH: 0
DIARRHEA: 0
NAUSEA: 0
CONSTIPATION: 0

## 2021-09-13 NOTE — PROGRESS NOTES
Abdominal:      General: There is no distension. Palpations: Abdomen is soft. Tenderness: There is no abdominal tenderness. There is no guarding. Comments: Incisions clean dry intact. No apparent signs of infection or compromise. Skin:     General: Skin is warm and dry. Neurological:      Mental Status: She is alert and oriented to person, place, and time. Psychiatric:         Mood and Affect: Mood normal.         Behavior: Behavior normal.         Thought Content: Thought content normal.         Judgment: Judgment normal.         Assessment:       Diagnosis Orders   1. Postop check     2. S/P robot-assisted surgical procedure     3. Endometriosis     4. Smoker     5. Morbid obesity with BMI of 40.0-44.9, adult [facility])             Plan:      Consider return to work  Postop care reviewed. Follow up prn and 4 weeks for postop visit.            Stefanie Fajardo DO

## 2021-09-20 ENCOUNTER — OFFICE VISIT (OUTPATIENT)
Dept: OBGYN CLINIC | Age: 41
End: 2021-09-20

## 2021-09-20 VITALS
DIASTOLIC BLOOD PRESSURE: 82 MMHG | HEART RATE: 96 BPM | BODY MASS INDEX: 42.45 KG/M2 | SYSTOLIC BLOOD PRESSURE: 130 MMHG | TEMPERATURE: 96.9 F | WEIGHT: 263 LBS

## 2021-09-20 DIAGNOSIS — Z09 POSTOP CHECK: Primary | ICD-10-CM

## 2021-09-20 DIAGNOSIS — Z90.710 HISTORY OF ROBOT-ASSISTED LAPAROSCOPIC HYSTERECTOMY: ICD-10-CM

## 2021-09-20 DIAGNOSIS — F17.200 SMOKER: ICD-10-CM

## 2021-09-20 DIAGNOSIS — E66.01 MORBID OBESITY WITH BMI OF 40.0-44.9, ADULT (HCC): ICD-10-CM

## 2021-09-20 PROCEDURE — 99024 POSTOP FOLLOW-UP VISIT: CPT | Performed by: OBSTETRICS & GYNECOLOGY

## 2021-09-26 PROBLEM — Z90.710 HISTORY OF ROBOT-ASSISTED LAPAROSCOPIC HYSTERECTOMY: Status: ACTIVE | Noted: 2021-08-06

## 2021-09-26 ASSESSMENT — ENCOUNTER SYMPTOMS
DIARRHEA: 0
VOMITING: 0
ABDOMINAL DISTENTION: 0
NAUSEA: 0
SHORTNESS OF BREATH: 0
ABDOMINAL PAIN: 0
CONSTIPATION: 0

## 2021-09-26 NOTE — PROGRESS NOTES
Subjective:      Patient ID: Duane Cortes is a 47 y.o. female. HPI  48 y/o  female presents for postop visit. Patient is 6 weeks s/p Davinci robotic total laparoscopic hysterectomy, lysis of adhesions, fulguration of endometriosis and diagnostic cystoscopy secondary to irregular and heavy menses. Has done well since surgery. Pain control has been excellent. Denies vaginal bleeding. Noted some brown discharge postop week 4 that has since resolved. Resumed sexual activity at 5 weeks and is back to work. Last pap smear was 2021--normal (no endocervical cells), negative testing for high risk HPV. Denies history of abnormal pap smear. Patient is 12 months  s/p operative laparoscopy for ruptured ectopic pregnancy, evacuation of hemoperitoneum, bilateral salpingectomy with frozen section of left fallopian tube, lysis of adhesions. History is positive for endometriosis, history of postpartum hemorrhage (2 u PRBCs), history of , and morbid obesity. Smoking 1 PPD. Review of Systems   Constitutional: Negative. Negative for activity change, appetite change, chills, fatigue, fever and unexpected weight change. Respiratory: Negative for shortness of breath. Cardiovascular: Negative for chest pain. Gastrointestinal: Negative for abdominal distention, abdominal pain, constipation, diarrhea, nausea and vomiting. Genitourinary: Negative for difficulty urinating, dysuria, hematuria, pelvic pain, vaginal bleeding, vaginal discharge and vaginal pain. Psychiatric/Behavioral: Negative. Objective:   Physical Exam  Vitals and nursing note reviewed. Constitutional:       General: She is not in acute distress. Appearance: Normal appearance. She is well-developed and normal weight. She is not ill-appearing, toxic-appearing or diaphoretic. Pulmonary:      Effort: Pulmonary effort is normal.   Abdominal:      General: There is no distension. Tenderness:  There is no

## 2025-05-23 ENCOUNTER — APPOINTMENT (OUTPATIENT)
Dept: CT IMAGING | Age: 45
End: 2025-05-23
Payer: COMMERCIAL

## 2025-05-23 ENCOUNTER — HOSPITAL ENCOUNTER (EMERGENCY)
Age: 45
Discharge: HOME | End: 2025-05-23
Attending: EMERGENCY MEDICINE
Payer: COMMERCIAL

## 2025-05-23 VITALS
TEMPERATURE: 98.3 F | DIASTOLIC BLOOD PRESSURE: 81 MMHG | SYSTOLIC BLOOD PRESSURE: 125 MMHG | WEIGHT: 260 LBS | RESPIRATION RATE: 16 BRPM | OXYGEN SATURATION: 98 % | HEART RATE: 71 BPM | BODY MASS INDEX: 41.97 KG/M2

## 2025-05-23 DIAGNOSIS — G44.209 TENSION HEADACHE: ICD-10-CM

## 2025-05-23 DIAGNOSIS — M43.6 TORTICOLLIS: ICD-10-CM

## 2025-05-23 DIAGNOSIS — S16.1XXA STRAIN OF NECK MUSCLE, INITIAL ENCOUNTER: Primary | ICD-10-CM

## 2025-05-23 LAB
ANION GAP SERPL CALCULATED.3IONS-SCNC: 11 MMOL/L (ref 3–16)
BASOPHILS # BLD: 0.1 K/UL (ref 0–0.2)
BASOPHILS NFR BLD: 1.1 %
BUN SERPL-MCNC: 9 MG/DL (ref 7–20)
CALCIUM SERPL-MCNC: 9.5 MG/DL (ref 8.3–10.6)
CHLORIDE SERPL-SCNC: 100 MMOL/L (ref 99–110)
CO2 SERPL-SCNC: 25 MMOL/L (ref 21–32)
CREAT SERPL-MCNC: 0.7 MG/DL (ref 0.6–1.1)
DEPRECATED RDW RBC AUTO: 13.4 % (ref 12.4–15.4)
EOSINOPHIL # BLD: 0.3 K/UL (ref 0–0.6)
EOSINOPHIL NFR BLD: 2.6 %
GFR SERPLBLD CREATININE-BSD FMLA CKD-EPI: >90 ML/MIN/{1.73_M2}
GLUCOSE SERPL-MCNC: 85 MG/DL (ref 70–99)
HCT VFR BLD AUTO: 41.2 % (ref 36–48)
HGB BLD-MCNC: 14.1 G/DL (ref 12–16)
LYMPHOCYTES # BLD: 2.9 K/UL (ref 1–5.1)
LYMPHOCYTES NFR BLD: 24.3 %
MCH RBC QN AUTO: 31.4 PG (ref 26–34)
MCHC RBC AUTO-ENTMCNC: 34.3 G/DL (ref 31–36)
MCV RBC AUTO: 91.4 FL (ref 80–100)
MONOCYTES # BLD: 0.7 K/UL (ref 0–1.3)
MONOCYTES NFR BLD: 6.2 %
NEUTROPHILS # BLD: 7.9 K/UL (ref 1.7–7.7)
NEUTROPHILS NFR BLD: 65.8 %
PLATELET # BLD AUTO: 342 K/UL (ref 135–450)
PMV BLD AUTO: 7.6 FL (ref 5–10.5)
POTASSIUM SERPL-SCNC: 4.2 MMOL/L (ref 3.5–5.1)
RBC # BLD AUTO: 4.51 M/UL (ref 4–5.2)
SODIUM SERPL-SCNC: 136 MMOL/L (ref 136–145)
WBC # BLD AUTO: 11.9 K/UL (ref 4–11)

## 2025-05-23 PROCEDURE — 96374 THER/PROPH/DIAG INJ IV PUSH: CPT

## 2025-05-23 PROCEDURE — 85025 COMPLETE CBC W/AUTO DIFF WBC: CPT

## 2025-05-23 PROCEDURE — 70498 CT ANGIOGRAPHY NECK: CPT

## 2025-05-23 PROCEDURE — 70450 CT HEAD/BRAIN W/O DYE: CPT

## 2025-05-23 PROCEDURE — 80048 BASIC METABOLIC PNL TOTAL CA: CPT

## 2025-05-23 PROCEDURE — 6370000000 HC RX 637 (ALT 250 FOR IP): Performed by: EMERGENCY MEDICINE

## 2025-05-23 PROCEDURE — 99285 EMERGENCY DEPT VISIT HI MDM: CPT

## 2025-05-23 PROCEDURE — 6360000002 HC RX W HCPCS: Performed by: EMERGENCY MEDICINE

## 2025-05-23 PROCEDURE — 6360000004 HC RX CONTRAST MEDICATION: Performed by: EMERGENCY MEDICINE

## 2025-05-23 RX ORDER — LIDOCAINE 50 MG/G
1 PATCH TOPICAL DAILY
Qty: 10 PATCH | Refills: 0 | Status: SHIPPED | OUTPATIENT
Start: 2025-05-23 | End: 2025-06-02

## 2025-05-23 RX ORDER — IOPAMIDOL 755 MG/ML
75 INJECTION, SOLUTION INTRAVASCULAR
Status: COMPLETED | OUTPATIENT
Start: 2025-05-23 | End: 2025-05-23

## 2025-05-23 RX ORDER — METHOCARBAMOL 750 MG/1
750 TABLET, FILM COATED ORAL 4 TIMES DAILY
Qty: 40 TABLET | Refills: 0 | Status: SHIPPED | OUTPATIENT
Start: 2025-05-23 | End: 2025-06-02

## 2025-05-23 RX ORDER — LIDOCAINE 4 G/G
1 PATCH TOPICAL ONCE
Status: DISCONTINUED | OUTPATIENT
Start: 2025-05-23 | End: 2025-05-23 | Stop reason: HOSPADM

## 2025-05-23 RX ORDER — METHOCARBAMOL 750 MG/1
1500 TABLET, FILM COATED ORAL ONCE
Status: COMPLETED | OUTPATIENT
Start: 2025-05-23 | End: 2025-05-23

## 2025-05-23 RX ORDER — FLUOXETINE HYDROCHLORIDE 40 MG/1
80 CAPSULE ORAL DAILY
COMMUNITY
Start: 2025-04-09

## 2025-05-23 RX ORDER — KETOROLAC TROMETHAMINE 30 MG/ML
15 INJECTION, SOLUTION INTRAMUSCULAR; INTRAVENOUS ONCE
Status: COMPLETED | OUTPATIENT
Start: 2025-05-23 | End: 2025-05-23

## 2025-05-23 RX ADMIN — KETOROLAC TROMETHAMINE 15 MG: 30 INJECTION, SOLUTION INTRAMUSCULAR at 17:19

## 2025-05-23 RX ADMIN — METHOCARBAMOL 1500 MG: 750 TABLET ORAL at 15:08

## 2025-05-23 RX ADMIN — IOPAMIDOL 75 ML: 755 INJECTION, SOLUTION INTRAVENOUS at 16:29

## 2025-05-23 ASSESSMENT — PAIN SCALES - GENERAL
PAINLEVEL_OUTOF10: 9
PAINLEVEL_OUTOF10: 9

## 2025-05-23 ASSESSMENT — PAIN - FUNCTIONAL ASSESSMENT: PAIN_FUNCTIONAL_ASSESSMENT: 0-10

## 2025-05-23 NOTE — ED PROVIDER NOTES
Emergency Department Provider Note  Location: [facility] EMERGENCY DEPARTMENT  2025     Patient Identification  Sruthi Pro is a 44 y.o. female    Chief Complaint  Ear Pain (Pt sent from urgent care for ear and neck pain, Doctor at urgent care thought she might have soft tissue damage at base of neck that's causing ear pain. Pt also states she's been sick with sinus infection for the last couple of days as well.)          HPI  (History provided by patient)  Patient is a 45-year-old female who was referred from urgent care for atypical headache in the setting of sinus infection with neck pain and ear pain.  Reports that she has had sinusitis symptoms for the last 3 to 4 days.  She reports that she developed a generalized headache 2 days ago and noticed that she applied pressure on at the occipital ridge but made her sinuses \"pop \"and this made her headache improved.  She lied on a tennis ball for some time for her symptoms.  The next day she had significant neck pain and rigidity felt like her neck muscles were locked up.  She went to urgent care and was referred for imaging.  She denies any vision changes numbness or weakness or fevers.  She denies any history of migraines.  She has had headaches in the setting of sinus congestion.      Nursing Notes were all reviewed and agreed with, or any disagreements were addressed in the HPI:  Allergies:   Allergies   Allergen Reactions    Tylenol With Codeine #3 [Acetaminophen-Codeine] Anaphylaxis    Dilaudid [Hydromorphone]        Past medical history:  has a past medical history of Abnormal Pap smear of cervix, Arthritis, BMI 45.0-49.9, adult (HCC), Endometriosis, Maternal blood transfusion (), Morbidly obese (HCC), and Pregnancy related carpal tunnel syndrome, antepartum.    Past surgical history:  has a past surgical history that includes Cholecystectomy (); pelvic laparoscopy (age 15);  section; laparoscopy (N/A, 2020); and

## 2025-07-03 ENCOUNTER — APPOINTMENT (OUTPATIENT)
Dept: CT IMAGING | Age: 45
End: 2025-07-03
Payer: COMMERCIAL

## 2025-07-03 ENCOUNTER — HOSPITAL ENCOUNTER (EMERGENCY)
Age: 45
Discharge: HOME | End: 2025-07-03
Attending: STUDENT IN AN ORGANIZED HEALTH CARE EDUCATION/TRAINING PROGRAM
Payer: COMMERCIAL

## 2025-07-03 VITALS
SYSTOLIC BLOOD PRESSURE: 131 MMHG | HEIGHT: 66 IN | OXYGEN SATURATION: 99 % | RESPIRATION RATE: 20 BRPM | BODY MASS INDEX: 40.66 KG/M2 | TEMPERATURE: 98.4 F | DIASTOLIC BLOOD PRESSURE: 79 MMHG | WEIGHT: 253 LBS | HEART RATE: 84 BPM
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
## 2025-07-03 DIAGNOSIS — R59.0 MEDIASTINAL LYMPHADENOPATHY: ICD-10-CM

## 2025-07-03 DIAGNOSIS — S20.221A CONTUSION OF RIGHT BACK WALL OF THORAX, INITIAL ENCOUNTER: ICD-10-CM

## 2025-07-03 LAB
ALBUMIN SERPL-MCNC: 4.2 G/DL (ref 3.4–5)
ALBUMIN/GLOB SERPL: 1.4 {RATIO} (ref 1.1–2.2)
ALP SERPL-CCNC: 78 U/L (ref 40–129)
ALT SERPL-CCNC: 17 U/L (ref 10–40)
ANION GAP SERPL CALCULATED.3IONS-SCNC: 8 MMOL/L (ref 3–16)
AST SERPL-CCNC: 21 U/L (ref 15–37)
BASOPHILS # BLD: 0.1 K/UL (ref 0–0.2)
BASOPHILS NFR BLD: 0.7 %
BILIRUB SERPL-MCNC: 0.4 MG/DL (ref 0–1)
BUN SERPL-MCNC: 7 MG/DL (ref 7–20)
CALCIUM SERPL-MCNC: 8.2 MG/DL (ref 8.3–10.6)
CHLORIDE SERPL-SCNC: 105 MMOL/L (ref 99–110)
CO2 SERPL-SCNC: 25 MMOL/L (ref 21–32)
CREAT SERPL-MCNC: 0.8 MG/DL (ref 0.6–1.1)
DEPRECATED RDW RBC AUTO: 13.4 % (ref 12.4–15.4)
EOSINOPHIL # BLD: 0.2 K/UL (ref 0–0.6)
EOSINOPHIL NFR BLD: 1.4 %
GFR SERPLBLD CREATININE-BSD FMLA CKD-EPI: >90 ML/MIN/{1.73_M2}
GLUCOSE SERPL-MCNC: 135 MG/DL (ref 70–99)
HCT VFR BLD AUTO: 41.1 % (ref 36–48)
HGB BLD-MCNC: 13.9 G/DL (ref 12–16)
LIPASE SERPL-CCNC: 51 U/L (ref 13–60)
LYMPHOCYTES # BLD: 1.8 K/UL (ref 1–5.1)
LYMPHOCYTES NFR BLD: 14.2 %
MCH RBC QN AUTO: 31.3 PG (ref 26–34)
MCHC RBC AUTO-ENTMCNC: 33.7 G/DL (ref 31–36)
MCV RBC AUTO: 92.9 FL (ref 80–100)
MONOCYTES # BLD: 0.6 K/UL (ref 0–1.3)
MONOCYTES NFR BLD: 5 %
NEUTROPHILS # BLD: 9.9 K/UL (ref 1.7–7.7)
NEUTROPHILS NFR BLD: 78.7 %
PLATELET # BLD AUTO: 320 K/UL (ref 135–450)
PMV BLD AUTO: 8.4 FL (ref 5–10.5)
POTASSIUM SERPL-SCNC: 4.4 MMOL/L (ref 3.5–5.1)
PROT SERPL-MCNC: 7.3 G/DL (ref 6.4–8.2)
RBC # BLD AUTO: 4.43 M/UL (ref 4–5.2)
SODIUM SERPL-SCNC: 138 MMOL/L (ref 136–145)
WBC # BLD AUTO: 12.6 K/UL (ref 4–11)

## 2025-07-03 PROCEDURE — 83690 ASSAY OF LIPASE: CPT

## 2025-07-03 PROCEDURE — 6370000000 HC RX 637 (ALT 250 FOR IP): Performed by: STUDENT IN AN ORGANIZED HEALTH CARE EDUCATION/TRAINING PROGRAM

## 2025-07-03 PROCEDURE — 96374 THER/PROPH/DIAG INJ IV PUSH: CPT

## 2025-07-03 PROCEDURE — 71260 CT THORAX DX C+: CPT

## 2025-07-03 PROCEDURE — 6360000002 HC RX W HCPCS: Performed by: STUDENT IN AN ORGANIZED HEALTH CARE EDUCATION/TRAINING PROGRAM

## 2025-07-03 PROCEDURE — 99285 EMERGENCY DEPT VISIT HI MDM: CPT

## 2025-07-03 PROCEDURE — 85025 COMPLETE CBC W/AUTO DIFF WBC: CPT

## 2025-07-03 PROCEDURE — 80053 COMPREHEN METABOLIC PANEL: CPT

## 2025-07-03 PROCEDURE — 6360000004 HC RX CONTRAST MEDICATION: Performed by: STUDENT IN AN ORGANIZED HEALTH CARE EDUCATION/TRAINING PROGRAM

## 2025-07-03 RX ORDER — FENTANYL CITRATE 50 UG/ML
50 INJECTION, SOLUTION INTRAMUSCULAR; INTRAVENOUS ONCE
Status: COMPLETED | OUTPATIENT
Start: 2025-07-03 | End: 2025-07-03

## 2025-07-03 RX ORDER — LIDOCAINE 4 G/G
1 PATCH TOPICAL ONCE
Status: DISCONTINUED | OUTPATIENT
Start: 2025-07-03 | End: 2025-07-03 | Stop reason: HOSPADM

## 2025-07-03 RX ORDER — METHOCARBAMOL 750 MG/1
750 TABLET, FILM COATED ORAL 4 TIMES DAILY
Qty: 20 TABLET | Refills: 0 | Status: SHIPPED | OUTPATIENT
Start: 2025-07-03 | End: 2025-07-08

## 2025-07-03 RX ORDER — METHOCARBAMOL 750 MG/1
750 TABLET, FILM COATED ORAL ONCE
Status: COMPLETED | OUTPATIENT
Start: 2025-07-03 | End: 2025-07-03

## 2025-07-03 RX ORDER — IOPAMIDOL 755 MG/ML
75 INJECTION, SOLUTION INTRAVASCULAR
Status: COMPLETED | OUTPATIENT
Start: 2025-07-03 | End: 2025-07-03

## 2025-07-03 RX ORDER — LIDOCAINE 4 G/G
1 PATCH TOPICAL DAILY
Qty: 30 PATCH | Refills: 0 | Status: SHIPPED | OUTPATIENT
Start: 2025-07-03 | End: 2025-08-02

## 2025-07-03 RX ADMIN — IOPAMIDOL 75 ML: 755 INJECTION, SOLUTION INTRAVENOUS at 15:41

## 2025-07-03 RX ADMIN — METHOCARBAMOL 750 MG: 750 TABLET ORAL at 16:49

## 2025-07-03 RX ADMIN — FENTANYL CITRATE 50 MCG: 50 INJECTION INTRAMUSCULAR; INTRAVENOUS at 14:14

## 2025-07-03 ASSESSMENT — LIFESTYLE VARIABLES
HOW OFTEN DO YOU HAVE A DRINK CONTAINING ALCOHOL: MONTHLY OR LESS
HOW MANY STANDARD DRINKS CONTAINING ALCOHOL DO YOU HAVE ON A TYPICAL DAY: 1 OR 2

## 2025-07-03 ASSESSMENT — PAIN DESCRIPTION - LOCATION: LOCATION: BACK;HAND

## 2025-07-03 ASSESSMENT — PAIN SCALES - GENERAL
PAINLEVEL_OUTOF10: 5
PAINLEVEL_OUTOF10: 7
PAINLEVEL_OUTOF10: 7
PAINLEVEL_OUTOF10: 5

## 2025-07-03 ASSESSMENT — PAIN DESCRIPTION - ORIENTATION: ORIENTATION: RIGHT

## 2025-07-03 NOTE — ED PROVIDER NOTES
to meet ALARA standards for radiation dose reduction.  In addition to vendor specific dose reduction algorithms, the dose reduction techniques vary based on the specific scanner utilized but frequently include automated exposure control, adjustment of the mA and/or kV according to patient size, and use of iterative reconstruction technique. Contrast: None FINDINGS: There is exaggerated thoracic kyphosis noted. There is normal alignment of the thoracic vertebrae with no evidence of acute fracture or traumatic abnormality seen. If concern discogenic disease, MRI correlation would be recommended.     No fracture seen. Electronically signed by Jaydon Aburto MD     No results found.     LABS: (none if blank)  Labs Reviewed   CBC WITH AUTO DIFFERENTIAL - Abnormal; Notable for the following components:       Result Value    WBC 12.6 (*)     Neutrophils Absolute 9.9 (*)     All other components within normal limits   COMPREHENSIVE METABOLIC PANEL W/ REFLEX TO MG FOR LOW K - Abnormal; Notable for the following components:    Glucose 135 (*)     Calcium 8.2 (*)     All other components within normal limits   LIPASE       (Any cultures that may have been sent were not resulted at the time of this patient visit)    MEDICAL DECISION MAKING / ED COURSE:     External Documentation Reviewed: Previous patient encounter documents & history available on EMR was reviewed:   Record from: Patient was seen on 5/23/2025 for strain of neck muscle in the ED     Decision Rules/Clinical Scores utilized:          ED Reassessment:  See ED course         Is this patient to be included in the SEP-1 core measure? No Exclusion criteria - the patient is NOT to be included for SEP-1 Core Measure due to: 2+ SIRS criteria are not met     MDM Summary:  History was obtained from: Patient chart review      This is a morbidly obese 42-year-old female who tripped and fell down 12 steps, no head or neck injury no loss of conscious, no anticoagulation,

## 2025-07-03 NOTE — DISCHARGE INSTRUCTIONS
Follow-up with your family doctor as discussed    You may continue taking your home medications as they are prescribed.    If you have been prescribed medications please make sure to monitor how to take them accordingly    Return if develop any new or worsening symptoms    THANK YOU for allowing me and the rest of the Emergency Department staff at [facility] to care for you today. We hope that your care has been nothing short of EXCELLENT today. In the near future you may receive a survey in the mail about your visit. Please fill this survey out and return it so that we can continue to provide you with EXCELLENT care.

## 2025-07-23 ENCOUNTER — OFFICE VISIT (OUTPATIENT)
Dept: OBGYN CLINIC | Age: 45
End: 2025-07-23

## 2025-07-23 VITALS
HEART RATE: 104 BPM | HEIGHT: 66 IN | BODY MASS INDEX: 42.04 KG/M2 | SYSTOLIC BLOOD PRESSURE: 134 MMHG | OXYGEN SATURATION: 99 % | WEIGHT: 261.6 LBS | DIASTOLIC BLOOD PRESSURE: 72 MMHG

## 2025-07-23 DIAGNOSIS — Z53.8 PAP SMEAR OF CERVIX NOT NEEDED: ICD-10-CM

## 2025-07-23 DIAGNOSIS — Z90.710 HISTORY OF ROBOT-ASSISTED LAPAROSCOPIC HYSTERECTOMY: ICD-10-CM

## 2025-07-23 DIAGNOSIS — N95.2 VAGINAL ATROPHY: ICD-10-CM

## 2025-07-23 DIAGNOSIS — F17.200 SMOKER: ICD-10-CM

## 2025-07-23 DIAGNOSIS — N80.9 ENDOMETRIOSIS: ICD-10-CM

## 2025-07-23 DIAGNOSIS — N95.1 MENOPAUSAL SYMPTOMS: ICD-10-CM

## 2025-07-23 DIAGNOSIS — Z01.419 WELL WOMAN EXAM WITH ROUTINE GYNECOLOGICAL EXAM: Primary | ICD-10-CM

## 2025-07-23 DIAGNOSIS — E66.01 MORBID OBESITY WITH BMI OF 40.0-44.9, ADULT (HCC): ICD-10-CM

## 2025-07-23 LAB
FSH SERPL-ACNC: 2.4 MIU/ML
LH SERPL-ACNC: 7.3 MIU/ML
PROLACTIN SERPL IA-MCNC: 13.5 NG/ML
TSH SERPL DL<=0.005 MIU/L-ACNC: 1.31 UIU/ML (ref 0.27–4.2)

## 2025-07-23 PROCEDURE — 99386 PREV VISIT NEW AGE 40-64: CPT | Performed by: OBSTETRICS & GYNECOLOGY

## 2025-07-23 RX ORDER — AMITRIPTYLINE HYDROCHLORIDE 10 MG/1
TABLET ORAL
COMMUNITY
Start: 2025-06-30

## 2025-07-23 RX ORDER — FERROUS SULFATE 325(65) MG
325 TABLET ORAL
COMMUNITY
Start: 2025-07-15

## 2025-07-23 RX ORDER — METFORMIN HYDROCHLORIDE 500 MG/1
500 TABLET, EXTENDED RELEASE ORAL 2 TIMES DAILY WITH MEALS
COMMUNITY

## 2025-07-23 RX ORDER — DIAZEPAM 5 MG/1
TABLET ORAL
COMMUNITY
Start: 2025-06-03 | End: 2025-07-23

## 2025-07-23 RX ORDER — BUSPIRONE HYDROCHLORIDE 30 MG/1
TABLET ORAL
COMMUNITY
Start: 2025-05-21

## 2025-07-23 RX ORDER — METHOCARBAMOL 500 MG/1
500 TABLET, FILM COATED ORAL AS NEEDED
COMMUNITY

## 2025-07-23 RX ORDER — HYDROXYZINE HYDROCHLORIDE 10 MG/1
TABLET, FILM COATED ORAL
COMMUNITY
Start: 2025-07-17

## 2025-07-23 RX ORDER — LISDEXAMFETAMINE DIMESYLATE 30 MG/1
30 CAPSULE ORAL EVERY MORNING
COMMUNITY
Start: 2025-06-24 | End: 2025-07-24

## 2025-07-23 RX ORDER — ESTRADIOL 10 UG/1
10 TABLET, FILM COATED VAGINAL
Qty: 24 TABLET | Refills: 3 | Status: SHIPPED | OUTPATIENT
Start: 2025-07-24

## 2025-07-23 RX ORDER — KETOCONAZOLE 20 MG/ML
SHAMPOO, SUSPENSION TOPICAL
COMMUNITY
Start: 2025-06-24

## 2025-08-10 PROBLEM — N95.1 MENOPAUSAL SYMPTOMS: Status: ACTIVE | Noted: 2025-08-10

## 2025-08-10 PROBLEM — N95.2 VAGINAL ATROPHY: Status: ACTIVE | Noted: 2025-08-10

## (undated) DEVICE — GOWN,AURORA,NONREINFORCED,LARGE: Brand: MEDLINE

## (undated) DEVICE — COVER,MAYO STAND,STERILE: Brand: MEDLINE

## (undated) DEVICE — SUTURE VCRL + SZ 3-0 L18IN ABSRB UD SH 1/2 CIR TAPERCUT NDL VCP864D

## (undated) DEVICE — ARM DRAPE

## (undated) DEVICE — GLOVE SURG SZ 65 THK91MIL LTX FREE SYN POLYISOPRENE

## (undated) DEVICE — SUTURE PDS II SZ 0 L27IN ABSRB VLT L26MM CT-2 1/2 CIR Z334H

## (undated) DEVICE — SUTURE VCRL SZ 0 L36IN ABSRB UD CT-1 L36MM 1/2 CIR TAPR PNT VCP946H

## (undated) DEVICE — 3M™ STERI-STRIP™ COMPOUND BENZOIN TINCTURE 40 BAGS/CARTON 4 CARTONS/CASE C1544: Brand: 3M™ STERI-STRIP™

## (undated) DEVICE — PUMP SUC IRR TBNG L10FT W/ HNDPC ASSEMB STRYKEFLOW 2

## (undated) DEVICE — GOWN SIRUS NONREIN LG W/TWL: Brand: MEDLINE INDUSTRIES, INC.

## (undated) DEVICE — Z DISCONTINUED PER MEDLINE TRAY SKIN PREP DRY W/ PREM GLV APPLICATORS GZ TWL OVERWRAP

## (undated) DEVICE — PAD TBL OP RM TRENDELENBURG STATIC TORSO W/STRAPS

## (undated) DEVICE — PAD,NON-ADHERENT,3X8,STERILE,LF,1/PK: Brand: MEDLINE

## (undated) DEVICE — MANIPULATOR 6.7MM RUMI UTERINE 6CM STER

## (undated) DEVICE — TRAY PREP DRY W/ PREM GLV 2 APPL 6 SPNG 2 UNDPD 1 OVERWRAP

## (undated) DEVICE — Device

## (undated) DEVICE — SYRINGE MED 10ML POLYPR GEN PURP FLAT TOP LUERLOCK TIP

## (undated) DEVICE — SOLUTION IV IRRIG WATER 1000ML POUR BRL 2F7114

## (undated) DEVICE — SCISSORS SURG DIA8MM MPLR CRV ENDOWRIST

## (undated) DEVICE — SUTURE VCRL + SZ 0 L27IN ABSRB VLT L26MM UR-6 5/8 CIR VCP603H

## (undated) DEVICE — CANNULA SEAL

## (undated) DEVICE — CHLORAPREP 26ML ORANGE

## (undated) DEVICE — NEEDLE HYPO 22GA L1.5IN BLK POLYPR HUB S STL REG BVL STR

## (undated) DEVICE — LONG TIP FORCEPS: Brand: ENDOWRIST

## (undated) DEVICE — TROCAR: Brand: KII FIOS FIRST ENTRY

## (undated) DEVICE — GAUZE,SPONGE,2"X2",8PLY,STERILE,LF,2'S: Brand: MEDLINE

## (undated) DEVICE — TIP COVER ACCESSORY

## (undated) DEVICE — COLUMN DRAPE

## (undated) DEVICE — SUTURE VCRL + SZ 4-0 L18IN ABSRB UD L19MM PS-2 3/8 CIR PRIM VCP496H

## (undated) DEVICE — 3M™ TEGADERM™ TRANSPARENT FILM DRESSING FRAME STYLE, 1624W, 2-3/8 IN X 2-3/4 IN (6 CM X 7 CM), 100/CT 4CT/CASE: Brand: 3M™ TEGADERM™

## (undated) DEVICE — 3M™ IOBAN™ 2 ANTIMICROBIAL INCISE DRAPE 6650EZ: Brand: IOBAN™ 2

## (undated) DEVICE — SHEARS ENDOSCP L36CM DIA5MM ULTRASONIC CRV TIP W/ ADV

## (undated) DEVICE — PACK PROCEDURE SURG GYN LAP CDS

## (undated) DEVICE — BLADELESS OBTURATOR: Brand: WECK VISTA

## (undated) DEVICE — GLOVE ORANGE PI 7   MSG9070

## (undated) DEVICE — IRRIG SET 3318604E IPC SUCTION W/RC

## (undated) DEVICE — TISSUE RETRIEVAL SYSTEM: Brand: INZII RETRIEVAL SYSTEM

## (undated) DEVICE — 3M™ STERI-STRIP™ REINFORCED ADHESIVE SKIN CLOSURES, R1548, 1 IN X 5 IN (25 MM X 125 MM), 4 STRIPS/ENVELOPE: Brand: 3M™ STERI-STRIP™

## (undated) DEVICE — CUP MANIP DIA2.5CM PNEUMO OCCL BLLN FOR ES RUMI II KOH

## (undated) DEVICE — SUTURE VCRL + SZ 2-0 L27IN ABSRB VLT UR-6 5/8 CIR TAPR PNT VCP602H

## (undated) DEVICE — SKIN AFFIX SURG ADHESIVE 72/CS 0.55ML: Brand: MEDLINE

## (undated) DEVICE — FENESTRATED BIPOLAR FORCEPS: Brand: ENDOWRIST

## (undated) DEVICE — LAPAROSCOPIC TROCAR SLEEVE/SINGLE USE: Brand: KII® OPTICAL ACCESS SYSTEM

## (undated) DEVICE — SUTURE VCRL SZ 0 L27IN ABSRB UD L26MM CT-2 1/2 CIR J270H

## (undated) DEVICE — MEGA SUTURECUT ND: Brand: ENDOWRIST

## (undated) DEVICE — TROCAR: Brand: KII SHIELDED BLADED ACCESS SYSTEM